# Patient Record
Sex: FEMALE | Race: WHITE | NOT HISPANIC OR LATINO | Employment: FULL TIME | URBAN - METROPOLITAN AREA
[De-identification: names, ages, dates, MRNs, and addresses within clinical notes are randomized per-mention and may not be internally consistent; named-entity substitution may affect disease eponyms.]

---

## 2018-10-02 ENCOUNTER — OFFICE VISIT (OUTPATIENT)
Dept: FAMILY MEDICINE CLINIC | Facility: CLINIC | Age: 38
End: 2018-10-02
Payer: COMMERCIAL

## 2018-10-02 VITALS
RESPIRATION RATE: 16 BRPM | BODY MASS INDEX: 21.04 KG/M2 | SYSTOLIC BLOOD PRESSURE: 110 MMHG | OXYGEN SATURATION: 99 % | TEMPERATURE: 97 F | HEART RATE: 62 BPM | WEIGHT: 122.6 LBS | DIASTOLIC BLOOD PRESSURE: 60 MMHG

## 2018-10-02 DIAGNOSIS — J32.9 CHRONIC SINUSITIS, UNSPECIFIED LOCATION: Primary | ICD-10-CM

## 2018-10-02 PROCEDURE — 99213 OFFICE O/P EST LOW 20 MIN: CPT | Performed by: PHYSICIAN ASSISTANT

## 2018-10-02 RX ORDER — CEFUROXIME AXETIL 500 MG/1
500 TABLET ORAL EVERY 12 HOURS SCHEDULED
Qty: 20 TABLET | Refills: 0 | Status: SHIPPED | OUTPATIENT
Start: 2018-10-02 | End: 2018-10-12

## 2018-10-02 RX ORDER — FLUTICASONE PROPIONATE 50 MCG
1-2 SPRAY, SUSPENSION (ML) NASAL DAILY
COMMUNITY
Start: 2015-08-12 | End: 2020-08-13 | Stop reason: ALTCHOICE

## 2018-10-02 RX ORDER — BENZONATATE 200 MG/1
200 CAPSULE ORAL 3 TIMES DAILY PRN
COMMUNITY
End: 2020-08-13 | Stop reason: ALTCHOICE

## 2018-10-02 RX ORDER — PREDNISONE 10 MG/1
TABLET ORAL
Qty: 30 TABLET | Refills: 0 | Status: SHIPPED | OUTPATIENT
Start: 2018-10-02 | End: 2018-10-12

## 2018-10-02 NOTE — PROGRESS NOTES
Assessment/Plan:         Diagnoses and all orders for this visit:    Chronic sinusitis, unspecified location  -     cefuroxime (CEFTIN) 500 mg tablet; Take 1 tablet (500 mg total) by mouth every 12 (twelve) hours for 10 days  -     predniSONE 10 mg tablet; 1-3-3-4-3-3-2-2-1-1 taper with food  -     Allergy Evaluation 1, [place]; Future  -     Allergy Evaluation 1, [address] [place] [address]  -     Ambulatory Referral to Otolaryngology; Future    Other orders  -     fluticasone (FLONASE) 50 mcg/act nasal spray; 1-2 sprays into each nostril daily  -     benzonatate (TESSALON) 200 MG capsule; Take 200 mg by mouth 3 (three) times a day as needed for cough      Discussed condition with pt  She has chronic persistent sinusitis  I explained that [place] are an antitussive, not an oral abx and she has no cough  I will treat her with 10 day course of Ceftin and also a 10 day oral Prednisone taper  Will also send her for allergy testing and refer to ENT for consult  She is to F/U if sx's do not improve  Chief Complaint   Patient presents with    Sinus Pressure     Patient presents for sinus pressure,congestion, pain in both ear  Patient also C/O headaches with nausea       Subjective:      Patient ID: Shannon Castro is a 34 y o  female  Pt presents with over month history of sinus pressure, HA, nasal congestion which she last had last week along with ST and PND  Denies cough, fever, chills, N/V/D  She used a [employer] service through her employer and was on at least one oral abx as well as what she believes was a steroid and then she thought she  was getting another abx but was given [place] and she does not have a cough  She has a history of chronic sinusitis  Believes she has environmental allergies but was never tested  Denies hx of asthma  Does not smoke  She has not yet received the flu shot           The following portions of the patient's history were reviewed and updated as appropriate: She  has no past medical history on file  She   Patient Active Problem List    Diagnosis Date Noted    Anxiety 12/09/2015    Chronic sinusitis 08/12/2015     She  has a past surgical history that includes No past surgeries  Her family history includes No Known Problems in her brother, father, sister, and sister  She  reports that she has never smoked  She has never used smokeless tobacco  She reports that she does not drink alcohol or use drugs  Current Outpatient Prescriptions   Medication Sig Dispense Refill    benzonatate (TESSALON) 200 MG capsule Take 200 mg by mouth 3 (three) times a day as needed for cough      fluticasone (FLONASE) 50 mcg/act nasal spray 1-2 sprays into each nostril daily      cefuroxime (CEFTIN) 500 mg tablet Take 1 tablet (500 mg total) by mouth every 12 (twelve) hours for 10 days 20 tablet 0    predniSONE 10 mg tablet 2-0-2-4-3-3-2-2-1-1 taper with food  30 tablet 0     No current facility-administered medications for this visit  No current outpatient prescriptions on file prior to visit  No current facility-administered medications on file prior to visit  She has No Known Allergies       Review of Systems   Constitutional: Negative  HENT: Positive for congestion, postnasal drip, sinus pressure and sore throat  Respiratory: Negative  Cardiovascular: Negative  Gastrointestinal: Negative  Genitourinary: Negative  Neurological: Positive for headaches  Objective:      /60 (BP Location: Right arm, Patient Position: Sitting, Cuff Size: Standard)   Pulse 62   Temp (!) 97 °F (36 1 °C) (Tympanic)   Resp 16   Wt 55 6 kg (122 lb 9 6 oz)   LMP 09/28/2018   SpO2 99%   BMI 21 04 kg/m²          Physical Exam   Constitutional: She is oriented to person, place, and time  She appears well-developed and well-nourished  No distress     HENT:   Right Ear: Hearing, tympanic membrane, external ear and ear canal normal    Left Ear: Hearing, tympanic membrane, external ear and ear canal normal    Nose: Mucosal edema (B/L boggy turbinates) present  Mouth/Throat: Mucous membranes are normal  Posterior oropharyngeal erythema (PND) present  No oropharyngeal exudate  Neck: Neck supple  Cardiovascular: Normal rate, regular rhythm and normal heart sounds  Pulmonary/Chest: Effort normal and breath sounds normal    Lymphadenopathy:     She has no cervical adenopathy  Neurological: She is alert and oriented to person, place, and time  Psychiatric: She has a normal mood and affect  Vitals reviewed

## 2018-11-24 ENCOUNTER — OFFICE VISIT (OUTPATIENT)
Dept: FAMILY MEDICINE CLINIC | Facility: CLINIC | Age: 38
End: 2018-11-24
Payer: COMMERCIAL

## 2018-11-24 VITALS
TEMPERATURE: 99.2 F | HEIGHT: 63 IN | HEART RATE: 67 BPM | OXYGEN SATURATION: 99 % | DIASTOLIC BLOOD PRESSURE: 70 MMHG | RESPIRATION RATE: 17 BRPM | BODY MASS INDEX: 21.74 KG/M2 | SYSTOLIC BLOOD PRESSURE: 104 MMHG | WEIGHT: 122.7 LBS

## 2018-11-24 DIAGNOSIS — J01.00 ACUTE NON-RECURRENT MAXILLARY SINUSITIS: Primary | ICD-10-CM

## 2018-11-24 DIAGNOSIS — J32.9 CHRONIC SINUSITIS, UNSPECIFIED LOCATION: ICD-10-CM

## 2018-11-24 PROCEDURE — 99213 OFFICE O/P EST LOW 20 MIN: CPT | Performed by: FAMILY MEDICINE

## 2018-11-24 RX ORDER — AMOXICILLIN AND CLAVULANATE POTASSIUM 875; 125 MG/1; MG/1
1 TABLET, FILM COATED ORAL EVERY 12 HOURS SCHEDULED
Qty: 20 TABLET | Refills: 0 | Status: SHIPPED | OUTPATIENT
Start: 2018-11-24 | End: 2018-12-04

## 2018-11-24 NOTE — PROGRESS NOTES
Assessment/Plan:    Chronic sinusitis   Patient with ongoing chronic sinus congestion and pressure  I reviewed her recent blood allergy panel, showing class 1 allergies to dust mites ( remainder of panel was negative)  In light of ongoing symptoms, I would recommend that she continue for fluticasone nasal spray  I also have referred her to ENT for further evaluation       Diagnoses and all orders for this visit:    Acute non-recurrent maxillary sinusitis  Comments:   will give Rx Augmentin b i d  times 10 days  Drink plenty of fluids  Call further problems  Orders:  -     amoxicillin-clavulanate (AUGMENTIN) 875-125 mg per tablet; Take 1 tablet by mouth every 12 (twelve) hours for 10 days    Chronic sinusitis, unspecified location  -     Ambulatory Referral to Otolaryngology; Future          Subjective:      Patient ID: Monroe Swanson is a 34 y o  female  1 week hx of sinus pressue, congestion  No fevers  Taking mucinex and flonase  Pt had a flu shot this year  patient also would like to discuss her ongoing chronic sinus pressure  She had recent allergy testing that she would like to review        The following portions of the patient's history were reviewed and updated as appropriate: allergies, current medications, past family history, past medical history, past social history, past surgical history and problem list     Review of Systems   Constitutional: Negative for chills and fever  HENT: Positive for congestion and postnasal drip  Respiratory: Positive for cough  Negative for shortness of breath  Cardiovascular: Negative  Gastrointestinal: Negative  Genitourinary: Negative            Objective:      /70 (BP Location: Left arm, Patient Position: Sitting, Cuff Size: Standard)   Pulse 67   Temp 99 2 °F (37 3 °C) (Tympanic)   Resp 17   Ht 5' 2 6" (1 59 m)   Wt 55 7 kg (122 lb 11 2 oz)   LMP 11/20/2018   SpO2 99%   BMI 22 01 kg/m²          Physical Exam   Constitutional: She appears well-developed and well-nourished  HENT:   Turbinates inflamed   Neck: Normal range of motion  Neck supple     Pulmonary/Chest: Effort normal and breath sounds normal

## 2018-11-24 NOTE — ASSESSMENT & PLAN NOTE
Patient with ongoing chronic sinus congestion and pressure  I reviewed her recent blood allergy panel, showing class 1 allergies to dust mites ( remainder of panel was negative)  In light of ongoing symptoms, I would recommend that she continue for fluticasone nasal spray    I also have referred her to ENT for further evaluation

## 2019-06-26 ENCOUNTER — OFFICE VISIT (OUTPATIENT)
Dept: FAMILY MEDICINE CLINIC | Facility: CLINIC | Age: 39
End: 2019-06-26
Payer: COMMERCIAL

## 2019-06-26 VITALS
TEMPERATURE: 99 F | HEIGHT: 63 IN | OXYGEN SATURATION: 98 % | RESPIRATION RATE: 16 BRPM | BODY MASS INDEX: 21.9 KG/M2 | DIASTOLIC BLOOD PRESSURE: 64 MMHG | HEART RATE: 73 BPM | WEIGHT: 123.6 LBS | SYSTOLIC BLOOD PRESSURE: 108 MMHG

## 2019-06-26 DIAGNOSIS — B96.89 ACUTE BACTERIAL SINUSITIS: Primary | ICD-10-CM

## 2019-06-26 DIAGNOSIS — J01.90 ACUTE BACTERIAL SINUSITIS: Primary | ICD-10-CM

## 2019-06-26 PROCEDURE — 99213 OFFICE O/P EST LOW 20 MIN: CPT | Performed by: FAMILY MEDICINE

## 2019-06-26 RX ORDER — MONTELUKAST SODIUM 10 MG/1
10 TABLET ORAL
COMMUNITY
Start: 2019-01-31 | End: 2019-06-26 | Stop reason: SDUPTHER

## 2019-06-26 RX ORDER — AMOXICILLIN AND CLAVULANATE POTASSIUM 875; 125 MG/1; MG/1
1 TABLET, FILM COATED ORAL EVERY 12 HOURS SCHEDULED
Qty: 20 TABLET | Refills: 0 | Status: SHIPPED | OUTPATIENT
Start: 2019-06-26 | End: 2019-07-06

## 2019-06-26 RX ORDER — MONTELUKAST SODIUM 10 MG/1
10 TABLET ORAL
Qty: 30 TABLET | Refills: 11 | Status: SHIPPED | OUTPATIENT
Start: 2019-06-26 | End: 2020-07-20

## 2020-07-18 DIAGNOSIS — B96.89 ACUTE BACTERIAL SINUSITIS: ICD-10-CM

## 2020-07-18 DIAGNOSIS — J01.90 ACUTE BACTERIAL SINUSITIS: ICD-10-CM

## 2020-07-20 RX ORDER — MONTELUKAST SODIUM 10 MG/1
10 TABLET ORAL
Qty: 90 TABLET | Refills: 0 | Status: SHIPPED | OUTPATIENT
Start: 2020-07-20 | End: 2020-10-14

## 2020-07-20 NOTE — TELEPHONE ENCOUNTER
Call patient  I refilled her Singulair but we have not seen her for a year  She should make an appointment

## 2020-08-13 ENCOUNTER — OFFICE VISIT (OUTPATIENT)
Dept: FAMILY MEDICINE CLINIC | Facility: CLINIC | Age: 40
End: 2020-08-13
Payer: COMMERCIAL

## 2020-08-13 ENCOUNTER — TELEPHONE (OUTPATIENT)
Dept: ADMINISTRATIVE | Facility: OTHER | Age: 40
End: 2020-08-13

## 2020-08-13 VITALS
TEMPERATURE: 98 F | DIASTOLIC BLOOD PRESSURE: 60 MMHG | WEIGHT: 128.8 LBS | SYSTOLIC BLOOD PRESSURE: 98 MMHG | HEIGHT: 63 IN | BODY MASS INDEX: 22.82 KG/M2 | OXYGEN SATURATION: 98 % | HEART RATE: 62 BPM

## 2020-08-13 DIAGNOSIS — Z13.6 ENCOUNTER FOR LIPID SCREENING FOR CARDIOVASCULAR DISEASE: ICD-10-CM

## 2020-08-13 DIAGNOSIS — R06.83 SNORING: ICD-10-CM

## 2020-08-13 DIAGNOSIS — Z00.00 ANNUAL PHYSICAL EXAM: Primary | ICD-10-CM

## 2020-08-13 DIAGNOSIS — J32.9 CHRONIC SINUSITIS, UNSPECIFIED LOCATION: ICD-10-CM

## 2020-08-13 DIAGNOSIS — Z13.220 ENCOUNTER FOR LIPID SCREENING FOR CARDIOVASCULAR DISEASE: ICD-10-CM

## 2020-08-13 DIAGNOSIS — Z13.1 SCREENING FOR DIABETES MELLITUS: ICD-10-CM

## 2020-08-13 PROCEDURE — 1036F TOBACCO NON-USER: CPT | Performed by: FAMILY MEDICINE

## 2020-08-13 PROCEDURE — 3008F BODY MASS INDEX DOCD: CPT | Performed by: FAMILY MEDICINE

## 2020-08-13 PROCEDURE — 3725F SCREEN DEPRESSION PERFORMED: CPT | Performed by: FAMILY MEDICINE

## 2020-08-13 PROCEDURE — 99396 PREV VISIT EST AGE 40-64: CPT | Performed by: FAMILY MEDICINE

## 2020-08-13 RX ORDER — TRIAMCINOLONE ACETONIDE 55 UG/1
SPRAY, METERED NASAL
Qty: 16.5 G | Refills: 1 | Status: SHIPPED | OUTPATIENT
Start: 2020-08-13 | End: 2021-05-12

## 2020-08-13 NOTE — PATIENT INSTRUCTIONS

## 2020-08-13 NOTE — TELEPHONE ENCOUNTER
Upon review of the In Basket request we were able to locate, review, and update the patient chart as requested for Pap Smear (HPV) aka Cervical Cancer Screening  Any additional questions or concerns should be emailed to the Practice Liaisons via [email or web address] email, please do not reply via In Basket      Thank you  Alice Calloway MA

## 2020-08-13 NOTE — PROGRESS NOTES
[facility]    NAME: Felice Bourgeois  AGE: 41 y o  SEX: female  : 1980     DATE: 2020     Assessment and Plan:     Patient 57-year-old female seen for well adult visit  Problem List Items Addressed This Visit        Respiratory    Chronic sinusitis    Relevant Medications    Triamcinolone Acetonide 55 MCG/ACT AERO    Other Relevant Orders    Ambulatory Referral to Otolaryngology    CBC and differential    TSH, 3rd generation with Free T4 reflex      Other Visit Diagnoses     Annual physical exam    -  Primary    Snoring        Relevant Orders    Ambulatory Referral to Otolaryngology    TSH, 3rd generation with Free T4 reflex    Encounter for lipid screening for cardiovascular disease        Relevant Orders    Lipid Panel with Direct LDL reflex    Screening for diabetes mellitus        Relevant Orders    Comprehensive metabolic panel        Patient was given orders for fasting blood work  She was also referred to ENT regarding snoring, chronic sinusitis and they could also check the posterior cervical node that she had noticed  Immunizations and preventive care screenings were discussed with patient today  Appropriate education was printed on patient's after visit summary  Counseling:  Alcohol/drug use: discussed moderation in alcohol intake, the recommendations for healthy alcohol use, and avoidance of illicit drug use  Dental Health: discussed importance of regular tooth brushing, flossing, and dental visits  · Exercise: the importance of regular exercise/physical activity was discussed  Recommend exercise 3-5 times per week for at least 30 minutes  No follow-ups on file       Chief Complaint:     Chief Complaint   Patient presents with    Physical Exam     Overdue for physical exam      History of Present Illness:     Adult Annual Physical   Patient here for a comprehensive physical exam  The patient reports problems -   concerns regarding allergies  Gets "sinus headaches" weekly - pressure in ears and eyes - takes Mucinex, along with Montelukast   Would like nasal spray again  Also snores  Diet and Physical Activity  · Diet/Nutrition: well balanced diet  Eats nutritiously  · Exercise: moderate cardiovascular exercise  Workout at home and walks     Depression Screening  PHQ-9 Depression Screening    PHQ-9:    Frequency of the following problems over the past two weeks:       Little interest or pleasure in doing things:  0 - not at all  Feeling down, depressed, or hopeless:  0 - not at all  PHQ-2 Score:  0       General Health  · Sleep:    Sleeps well  But snores so loud that  has to wear earplugs  · Hearing: normal - bilateral   · Vision: wears glasses  Glasses  · Dental: regular dental visits  /GYN Health  · Patient is: premenopausalMenses are heavy  · Last menstrual period: July 27  She does see a gynecologist      Review of Systems:     Review of Systems   Constitutional: Negative for chills and fever  HENT: Positive for congestion, sinus pressure and sinus pain  Negative for sore throat  Eyes: Positive for discharge  Respiratory: Negative for chest tightness  Cardiovascular: Negative for chest pain and palpitations  Gastrointestinal: Negative for abdominal pain, constipation, diarrhea and nausea  Genitourinary: Positive for menstrual problem (heavy, but regular)  Negative for difficulty urinating  Musculoskeletal: Negative  Skin: Negative  Neurological: Positive for headaches  Negative for dizziness  Hematological: Positive for adenopathy  Lump on let side of neck   Psychiatric/Behavioral: Negative  Past Medical History:     History reviewed  No pertinent past medical history  Past Surgical History:     Past Surgical History:   Procedure Laterality Date    NO PAST SURGERIES        Social History:   Has 8 15and 25year old       Social History     Socioeconomic History    Marital status: /Civil Union     Spouse name: None    Number of children: None    Years of education: None    Highest education level: None   Occupational History    None   Social Needs    Financial resource strain: None    Food insecurity     Worry: None     Inability: None    Transportation needs     Medical: None     Non-medical: None   Tobacco Use    Smoking status: Never Smoker    Smokeless tobacco: Never Used   Substance and Sexual Activity    Alcohol use: No    Drug use: No    Sexual activity: None   Lifestyle    Physical activity     Days per week: None     Minutes per session: None    Stress: None   Relationships    Social connections     Talks on phone: None     Gets together: None     Attends Gnosticism service: None     Active member of club or organization: None     Attends meetings of clubs or organizations: None     Relationship status: None    Intimate partner violence     Fear of current or ex partner: None     Emotionally abused: None     Physically abused: None     Forced sexual activity: None   Other Topics Concern    None   Social History Narrative    Occasional alcohol use - As per [employer]       Family History:     Family History   Problem Relation Age of Onset    No Known Problems Mother     No Known Problems Father     No Known Problems Sister     No Known Problems Brother     No Known Problems Sister    daughter with congenital heart disease   Current Medications:     Current Outpatient Medications   Medication Sig Dispense Refill    montelukast (SINGULAIR) 10 mg tablet TAKE 1 TABLET (10 MG TOTAL) BY MOUTH DAILY AT BEDTIME 90 tablet 0    Triamcinolone Acetonide 55 MCG/ACT AERO One spray each nostril twice a day  16 5 g 1     No current facility-administered medications for this visit         Allergies:     No Known Allergies   Physical Exam:     BP 98/60 (BP Location: Right arm, Patient Position: Sitting, Cuff Size: Adult) Pulse 62   Temp 98 °F (36 7 °C)   Ht 5' 3 39" (1 61 m)   Wt 58 4 kg (128 lb 12 8 oz)   LMP 07/27/2020 (Exact Date)   SpO2 98%   BMI 22 54 kg/m²     Physical Exam  Vitals signs and nursing note reviewed  Constitutional:       General: She is not in acute distress  Appearance: She is well-developed  HENT:      Head: Normocephalic  Right Ear: Tympanic membrane normal       Left Ear: Tympanic membrane normal       Nose: Congestion present  Eyes:      Pupils: Pupils are equal, round, and reactive to light  Neck:      Thyroid: No thyromegaly  Vascular: No carotid bruit  Cardiovascular:      Rate and Rhythm: Normal rate and regular rhythm  Heart sounds: Normal heart sounds  No murmur  Pulmonary:      Effort: Pulmonary effort is normal       Breath sounds: Normal breath sounds  Abdominal:      General: Bowel sounds are normal       Palpations: Abdomen is soft  Lymphadenopathy:      Cervical: Cervical adenopathy (freely movable nodule left posterior cervical node ) present  Skin:     General: Skin is warm and dry  Neurological:      Mental Status: She is alert and oriented to person, place, and time     Psychiatric:         Mood and Affect: Mood normal           Radha Reddy DO  [address]

## 2020-08-13 NOTE — TELEPHONE ENCOUNTER
----- Message from Selin Eastman, Chriss Priya Gonsalezd sent at 8/13/2020 12:02 PM EDT -----  Regarding: [facility]- Pap  08/13/20 12:02 PM    Hello, our patient Dora Bailey has had   Pap completed/performed  Please assist in updating the patient chart by pulling the Care Everywhere (CE) document  The date of service is 8/28/18       Thank you,  Selin Eastman MA  [facility]

## 2020-10-14 DIAGNOSIS — B96.89 ACUTE BACTERIAL SINUSITIS: ICD-10-CM

## 2020-10-14 DIAGNOSIS — J01.90 ACUTE BACTERIAL SINUSITIS: ICD-10-CM

## 2020-10-14 RX ORDER — MONTELUKAST SODIUM 10 MG/1
TABLET ORAL
Qty: 90 TABLET | Refills: 0 | Status: SHIPPED | OUTPATIENT
Start: 2020-10-14 | End: 2021-02-01

## 2021-01-31 DIAGNOSIS — B96.89 ACUTE BACTERIAL SINUSITIS: ICD-10-CM

## 2021-01-31 DIAGNOSIS — J01.90 ACUTE BACTERIAL SINUSITIS: ICD-10-CM

## 2021-02-01 RX ORDER — MONTELUKAST SODIUM 10 MG/1
TABLET ORAL
Qty: 90 TABLET | Refills: 0 | Status: SHIPPED | OUTPATIENT
Start: 2021-02-01

## 2021-04-14 DIAGNOSIS — Z23 ENCOUNTER FOR IMMUNIZATION: ICD-10-CM

## 2021-04-22 ENCOUNTER — IMMUNIZATIONS (OUTPATIENT)
Dept: FAMILY MEDICINE CLINIC | Facility: HOSPITAL | Age: 41
End: 2021-04-22

## 2021-04-22 DIAGNOSIS — Z23 ENCOUNTER FOR IMMUNIZATION: Primary | ICD-10-CM

## 2021-04-22 PROCEDURE — 0011A SARS-COV-2 / COVID-19 MRNA VACCINE (MODERNA) 100 MCG: CPT

## 2021-04-22 PROCEDURE — 91301 SARS-COV-2 / COVID-19 MRNA VACCINE (MODERNA) 100 MCG: CPT

## 2021-05-12 ENCOUNTER — OFFICE VISIT (OUTPATIENT)
Dept: FAMILY MEDICINE CLINIC | Facility: CLINIC | Age: 41
End: 2021-05-12
Payer: COMMERCIAL

## 2021-05-12 VITALS
OXYGEN SATURATION: 99 % | WEIGHT: 130.6 LBS | BODY MASS INDEX: 22.3 KG/M2 | HEART RATE: 55 BPM | DIASTOLIC BLOOD PRESSURE: 72 MMHG | HEIGHT: 64 IN | TEMPERATURE: 98.8 F | SYSTOLIC BLOOD PRESSURE: 104 MMHG

## 2021-05-12 DIAGNOSIS — R59.0 LYMPHADENOPATHY OF LEFT CERVICAL REGION: Primary | ICD-10-CM

## 2021-05-12 PROCEDURE — 99213 OFFICE O/P EST LOW 20 MIN: CPT | Performed by: FAMILY MEDICINE

## 2021-05-12 NOTE — PROGRESS NOTES
Assessment/Plan:     1  Lymphadenopathy of left cervical region  Assessment & Plan:  One of the 2 LN are palpating large on exam about 2 cm; check labs and u/s; may be reactive to vaccine; will need to follow; f/u guidance given    Orders:  -     US head neck soft tissue; Future; Expected date: 05/12/2021  -     CBC and differential; Future  -     TSH, 3rd generation; Future  -     C-reactive protein; Future  -     Sedimentation rate, automated; Future  -     Comprehensive metabolic panel; Future        Subjective:      Patient ID: Malathi Constantino is a 41 y o  female  Patient states she noticed 2 lumps in her neck  Noticed it today  No pain  No fevers/chills  No recent known illness or fatigue  No rashes  No sore throat  Mammogram in Sept 2020 was [facility]  Noticed a little weight gain but no unintended weight loss  No cough  Had 1st COVID vaccine a few weeks ago on same side as lumps  Noted after vaccine had some redness and swelling in arm that has since resolved  She noticed she had a few bumps in her neck a few months ago but were small  No changes  The following portions of the patient's history were reviewed and updated as appropriate: allergies, current medications, past family history, past medical history, past social history, past surgical history, and problem list     Review of Systems   Constitutional: Negative for chills, fatigue and fever  HENT: Negative  Respiratory: Negative  Cardiovascular: Negative  Gastrointestinal: Negative  Hematological: Positive for adenopathy  Objective:      /72 (BP Location: Right arm, Patient Position: Sitting, Cuff Size: Adult)   Pulse 55   Temp 98 8 °F (37 1 °C)   Ht 5' 3 78" (1 62 m)   Wt 59 2 kg (130 lb 9 6 oz)   LMP 05/10/2021 (Exact Date)   SpO2 99%   BMI 22 57 kg/m²          Physical Exam  Vitals signs reviewed  Constitutional:       General: She is not in acute distress  Appearance: Normal appearance   She is not ill-appearing, toxic-appearing or diaphoretic  HENT:      Head: Normocephalic and atraumatic  Right Ear: Tympanic membrane, ear canal and external ear normal  There is no impacted cerumen  Left Ear: Tympanic membrane, ear canal and external ear normal  There is no impacted cerumen  Nose: Nose normal  No congestion or rhinorrhea  Mouth/Throat:      Mouth: Mucous membranes are moist       Pharynx: Oropharynx is clear  Eyes:      General: No scleral icterus  Right eye: No discharge  Left eye: No discharge  Conjunctiva/sclera: Conjunctivae normal    Neck:      Musculoskeletal: Neck supple  No edema or erythema  Thyroid: No thyroid mass, thyromegaly or thyroid tenderness  Trachea: Trachea normal      Cardiovascular:      Rate and Rhythm: Normal rate and regular rhythm  Pulses: Normal pulses  Heart sounds: Normal heart sounds  No murmur  No gallop  Pulmonary:      Effort: Pulmonary effort is normal  No respiratory distress  Breath sounds: Normal breath sounds  No stridor  No wheezing, rhonchi or rales  Musculoskeletal:      Right lower leg: No edema  Left lower leg: No edema  Lymphadenopathy:      Cervical: Cervical adenopathy present  Neurological:      General: No focal deficit present  Mental Status: She is alert and oriented to person, place, and time  Psychiatric:         Mood and Affect: Mood normal          Behavior: Behavior normal          Thought Content:  Thought content normal          Judgment: Judgment normal

## 2021-05-12 NOTE — ASSESSMENT & PLAN NOTE
One of the 2 LN are palpating large on exam about 2 cm, check labs and u/s given size; may be reactive to vaccine; will need to follow; f/u guidance given

## 2021-05-13 ENCOUNTER — APPOINTMENT (OUTPATIENT)
Dept: LAB | Facility: CLINIC | Age: 41
End: 2021-05-13

## 2021-05-13 ENCOUNTER — TELEPHONE (OUTPATIENT)
Dept: ADMINISTRATIVE | Facility: OTHER | Age: 41
End: 2021-05-13

## 2021-05-13 DIAGNOSIS — R59.0 LYMPHADENOPATHY OF LEFT CERVICAL REGION: ICD-10-CM

## 2021-05-13 LAB
ALBUMIN SERPL BCP-MCNC: 4 G/DL (ref 3.5–5)
ALP SERPL-CCNC: 44 U/L (ref 46–116)
ALT SERPL W P-5'-P-CCNC: 20 U/L (ref 12–78)
ANION GAP SERPL CALCULATED.3IONS-SCNC: 5 MMOL/L (ref 4–13)
AST SERPL W P-5'-P-CCNC: 12 U/L (ref 5–45)
BASOPHILS # BLD AUTO: 0.07 THOUSANDS/ΜL (ref 0–0.1)
BASOPHILS NFR BLD AUTO: 1 % (ref 0–1)
BILIRUB SERPL-MCNC: 0.33 MG/DL (ref 0.2–1)
BUN SERPL-MCNC: 13 MG/DL (ref 5–25)
CALCIUM SERPL-MCNC: 9.3 MG/DL (ref 8.3–10.1)
CHLORIDE SERPL-SCNC: 104 MMOL/L (ref 100–108)
CO2 SERPL-SCNC: 28 MMOL/L (ref 21–32)
CREAT SERPL-MCNC: 0.76 MG/DL (ref 0.6–1.3)
CRP SERPL QL: <3 MG/L
EOSINOPHIL # BLD AUTO: 0.23 THOUSAND/ΜL (ref 0–0.61)
EOSINOPHIL NFR BLD AUTO: 3 % (ref 0–6)
ERYTHROCYTE [DISTWIDTH] IN BLOOD BY AUTOMATED COUNT: 14 % (ref 11.6–15.1)
ERYTHROCYTE [SEDIMENTATION RATE] IN BLOOD: 8 MM/HOUR (ref 0–19)
GFR SERPL CREATININE-BSD FRML MDRD: 97 ML/MIN/1.73SQ M
GLUCOSE SERPL-MCNC: 88 MG/DL (ref 65–140)
HCT VFR BLD AUTO: 38 % (ref 34.8–46.1)
HGB BLD-MCNC: 11.8 G/DL (ref 11.5–15.4)
IMM GRANULOCYTES # BLD AUTO: 0.02 THOUSAND/UL (ref 0–0.2)
IMM GRANULOCYTES NFR BLD AUTO: 0 % (ref 0–2)
LYMPHOCYTES # BLD AUTO: 2.41 THOUSANDS/ΜL (ref 0.6–4.47)
LYMPHOCYTES NFR BLD AUTO: 31 % (ref 14–44)
MCH RBC QN AUTO: 27.3 PG (ref 26.8–34.3)
MCHC RBC AUTO-ENTMCNC: 31.1 G/DL (ref 31.4–37.4)
MCV RBC AUTO: 88 FL (ref 82–98)
MONOCYTES # BLD AUTO: 0.7 THOUSAND/ΜL (ref 0.17–1.22)
MONOCYTES NFR BLD AUTO: 9 % (ref 4–12)
NEUTROPHILS # BLD AUTO: 4.47 THOUSANDS/ΜL (ref 1.85–7.62)
NEUTS SEG NFR BLD AUTO: 56 % (ref 43–75)
NRBC BLD AUTO-RTO: 0 /100 WBCS
PLATELET # BLD AUTO: 262 THOUSANDS/UL (ref 149–390)
PMV BLD AUTO: 12.1 FL (ref 8.9–12.7)
POTASSIUM SERPL-SCNC: 3.8 MMOL/L (ref 3.5–5.3)
PROT SERPL-MCNC: 7.5 G/DL (ref 6.4–8.2)
RBC # BLD AUTO: 4.32 MILLION/UL (ref 3.81–5.12)
SODIUM SERPL-SCNC: 137 MMOL/L (ref 136–145)
TSH SERPL DL<=0.05 MIU/L-ACNC: 2.13 UIU/ML (ref 0.36–3.74)
WBC # BLD AUTO: 7.9 THOUSAND/UL (ref 4.31–10.16)

## 2021-05-13 PROCEDURE — 80053 COMPREHEN METABOLIC PANEL: CPT

## 2021-05-13 PROCEDURE — 85652 RBC SED RATE AUTOMATED: CPT

## 2021-05-13 PROCEDURE — 85025 COMPLETE CBC W/AUTO DIFF WBC: CPT

## 2021-05-13 PROCEDURE — 86140 C-REACTIVE PROTEIN: CPT

## 2021-05-13 PROCEDURE — 84443 ASSAY THYROID STIM HORMONE: CPT

## 2021-05-13 PROCEDURE — 36415 COLL VENOUS BLD VENIPUNCTURE: CPT

## 2021-05-13 NOTE — TELEPHONE ENCOUNTER
Upon review of the In Basket request we were able to locate, review, and update the patient chart as requested for Mammogram     Any additional questions or concerns should be emailed to the Practice Liaisons via [email or web address] email, please do not reply via In Basket      Thank you  Nemo Joy

## 2021-05-13 NOTE — TELEPHONE ENCOUNTER
----- Message from Shalini Rose MA sent at 5/12/2021  3:51 PM EDT -----  Regarding: Care Gap Request  05/12/21 3:51 PM    Hello, our patient Garrett Lyles has had Mammogram completed/performed  Please assist in updating the patient chart by pulling the Care Everywhere (CE) document  The date of service is 9/29/20       Thank you,  Shalini Rose MA  [facility]

## 2021-05-20 ENCOUNTER — HOSPITAL ENCOUNTER (OUTPATIENT)
Dept: ULTRASOUND IMAGING | Facility: MEDICAL CENTER | Age: 41
Discharge: HOME | End: 2021-05-20
Payer: COMMERCIAL

## 2021-05-20 DIAGNOSIS — R59.0 LYMPHADENOPATHY OF LEFT CERVICAL REGION: ICD-10-CM

## 2021-05-20 PROCEDURE — 76536 US EXAM OF HEAD AND NECK: CPT

## 2021-05-26 ENCOUNTER — IMMUNIZATIONS (OUTPATIENT)
Dept: FAMILY MEDICINE CLINIC | Facility: HOSPITAL | Age: 41
End: 2021-05-26

## 2021-05-26 DIAGNOSIS — Z23 ENCOUNTER FOR IMMUNIZATION: Primary | ICD-10-CM

## 2021-05-26 PROCEDURE — 91301 SARS-COV-2 / COVID-19 MRNA VACCINE (MODERNA) 100 MCG: CPT

## 2021-05-26 PROCEDURE — 0012A SARS-COV-2 / COVID-19 MRNA VACCINE (MODERNA) 100 MCG: CPT

## 2021-06-15 ENCOUNTER — OFFICE VISIT (OUTPATIENT)
Dept: FAMILY MEDICINE CLINIC | Facility: CLINIC | Age: 41
End: 2021-06-15
Payer: COMMERCIAL

## 2021-06-15 VITALS
WEIGHT: 130.4 LBS | OXYGEN SATURATION: 97 % | HEIGHT: 64 IN | SYSTOLIC BLOOD PRESSURE: 102 MMHG | HEART RATE: 63 BPM | BODY MASS INDEX: 22.26 KG/M2 | DIASTOLIC BLOOD PRESSURE: 64 MMHG | TEMPERATURE: 97.1 F | RESPIRATION RATE: 16 BRPM

## 2021-06-15 DIAGNOSIS — R59.0 LYMPHADENOPATHY OF LEFT CERVICAL REGION: Primary | ICD-10-CM

## 2021-06-15 PROCEDURE — 99213 OFFICE O/P EST LOW 20 MIN: CPT | Performed by: FAMILY MEDICINE

## 2021-06-15 NOTE — PROGRESS NOTES
Assessment/Plan:     1  Lymphadenopathy of left cervical region  Assessment & Plan:  Decreased in size; no concerning findings on u/s or labs; advised likely secondary to COVID19 vaccine; will continue to monitor; f/u guidance given should changes occur          Subjective:      Patient ID: Anisa Zhu is a 41 y o  female  Patient is feeling well  Feels LNs are smaller  Had ultrasound that did not show any concerning morphology  CBC was WNL  Pt had second COVID19 vaccine end of May on same side  UTD on preventative screening  The following portions of the patient's history were reviewed and updated as appropriate: allergies, current medications, past family history, past medical history, past social history, past surgical history, and problem list     Review of Systems   Constitutional: Negative for chills, fatigue and fever  Respiratory: Negative for shortness of breath  Cardiovascular: Negative for chest pain and palpitations  Hematological: Negative for adenopathy  Does not bruise/bleed easily  Objective:      /64 (BP Location: Left arm, Patient Position: Sitting, Cuff Size: Standard)   Pulse 63   Temp (!) 97 1 °F (36 2 °C) (Tympanic)   Resp 16   Ht 5' 4" (1 626 m)   Wt 59 1 kg (130 lb 6 4 oz)   LMP 06/04/2021   SpO2 97%   BMI 22 38 kg/m²          Physical Exam  Vitals reviewed  Constitutional:       General: She is not in acute distress  Appearance: Normal appearance  She is not ill-appearing, toxic-appearing or diaphoretic  HENT:      Head: Normocephalic and atraumatic  Eyes:      General: No scleral icterus  Right eye: No discharge  Left eye: No discharge  Conjunctiva/sclera: Conjunctivae normal    Neck:     Cardiovascular:      Rate and Rhythm: Normal rate and regular rhythm  Pulses: Normal pulses  Heart sounds: Normal heart sounds  No murmur heard  No gallop      Pulmonary:      Effort: Pulmonary effort is normal  No respiratory distress  Breath sounds: Normal breath sounds  No stridor  No wheezing, rhonchi or rales  Musculoskeletal:      Right lower leg: No edema  Left lower leg: No edema  Neurological:      General: No focal deficit present  Mental Status: She is alert and oriented to person, place, and time  Psychiatric:         Mood and Affect: Mood normal          Behavior: Behavior normal          Thought Content:  Thought content normal          Judgment: Judgment normal

## 2021-06-15 NOTE — ASSESSMENT & PLAN NOTE
Decreased in size; no concerning findings on u/s or labs; advised likely secondary to COVID19 vaccine; will continue to monitor; f/u guidance given should changes occur

## 2022-01-12 ENCOUNTER — TELEMEDICINE (OUTPATIENT)
Dept: FAMILY MEDICINE CLINIC | Facility: CLINIC | Age: 42
End: 2022-01-12
Payer: COMMERCIAL

## 2022-01-12 DIAGNOSIS — J01.40 ACUTE NON-RECURRENT PANSINUSITIS: Primary | ICD-10-CM

## 2022-01-12 PROCEDURE — 99213 OFFICE O/P EST LOW 20 MIN: CPT | Performed by: FAMILY MEDICINE

## 2022-01-12 RX ORDER — BENZONATATE 100 MG/1
100 CAPSULE ORAL 3 TIMES DAILY PRN
Qty: 20 CAPSULE | Refills: 0 | Status: SHIPPED | OUTPATIENT
Start: 2022-01-12

## 2022-01-12 RX ORDER — AMOXICILLIN AND CLAVULANATE POTASSIUM 875; 125 MG/1; MG/1
1 TABLET, FILM COATED ORAL EVERY 12 HOURS SCHEDULED
Qty: 14 TABLET | Refills: 0 | Status: SHIPPED | OUTPATIENT
Start: 2022-01-12 | End: 2022-01-19

## 2022-01-12 NOTE — PROGRESS NOTES
Virtual Regular Visit    Verification of patient location:    Patient is located in the following state in which I hold an active license [place]      Assessment/Plan:    Problem List Items Addressed This Visit        Respiratory    Acute non-recurrent pansinusitis - Primary     C/w sinusitis given sx >10 days; abx as ordered; advised antihistamines and nasal saline; c/w OTC symptom relief; f/u guidance given should sx worsen or not resolve with medication         Relevant Medications    amoxicillin-clavulanate (AUGMENTIN) 875-125 mg per tablet    benzonatate (TESSALON PERLES) 100 mg capsule               Reason for visit is   Chief Complaint   Patient presents with    Cold Like Symptoms     patient c/o Sore Throat , Dry Cough , Nasal Drip appox 2weeks     COVID-19     at home test was negative 2 days ago    Virtual Regular Visit        Encounter provider Roger Gooden DO    Provider located at [address]   [address] [facility] [address]  [phone]      Recent Visits  No visits were found meeting these conditions  Showing recent visits within past 7 days and meeting all other requirements  Today's Visits  Date Type Provider Dept   01/12/22 Telemedicine Roger Gooden DO Pg [address] Beka Jaffe today's visits and meeting all other requirements  Future Appointments  No visits were found meeting these conditions  Showing future appointments within next 150 days and meeting all other requirements       The patient was identified by name and date of birth  Sophia Crowell was informed that this is a telemedicine visit and that the visit is being conducted through [address] [place] [address] Now and patient was informed that this is a secure, HIPAA-compliant platform  She agrees to proceed     My office door was closed  No one else was in the room  She acknowledged consent and understanding of privacy and security of the video platform   The patient has agreed to participate and understands they can discontinue the visit at any time  Patient is aware this is a billable service  Travis Saez is a 35 y o  female persistent congestion  Upper Respiratory Infection  Patient complains of symptoms of a URI, possible sinusitis  Symptoms include achiness, nasal congestion, non productive cough, post nasal drip, sinus pressure and sore throat  Onset of symptoms was 2 weeks ago, and has been gradually worsening since that time  Treatment to date: cough suppressants and decongestants  Took home COVID test and was negative  Pt had chills and body aches initially which have resolved but has continued sinus pressure and congestion  Using nasal spray and OTC medication without improvement  No past medical history on file  Past Surgical History:   Procedure Laterality Date    NO PAST SURGERIES         Current Outpatient Medications   Medication Sig Dispense Refill    montelukast (SINGULAIR) 10 mg tablet TAKE 1 TABLET BY MOUTH DAILY AT BEDTIME 90 tablet 0    propranolol (INDERAL LA) 80 mg 24 hr capsule Take 1 capsule (80 mg total) by mouth daily 90 capsule 3    amoxicillin-clavulanate (AUGMENTIN) 875-125 mg per tablet Take 1 tablet by mouth every 12 (twelve) hours for 7 days 14 tablet 0    benzonatate (TESSALON PERLES) 100 mg capsule Take 1 capsule (100 mg total) by mouth 3 (three) times a day as needed for cough 20 capsule 0     No current facility-administered medications for this visit  No Known Allergies    Review of Systems   Constitutional: Negative for chills and fever  HENT: Positive for congestion, postnasal drip, rhinorrhea, sinus pressure and sore throat  Respiratory: Positive for cough  Negative for shortness of breath and wheezing  Video Exam    There were no vitals filed for this visit  Physical Exam  Constitutional:       General: She is not in acute distress  Appearance: Normal appearance  She is not ill-appearing or toxic-appearing  HENT:      Head: Normocephalic and atraumatic  Pulmonary:      Effort: Pulmonary effort is normal    Neurological:      General: No focal deficit present  Mental Status: She is alert and oriented to person, place, and time  Psychiatric:         Mood and Affect: Mood normal          Behavior: Behavior normal          Thought Content: Thought content normal          Judgment: Judgment normal           I spent 8 minutes directly with the patient during this visit    VIRTUAL VISIT [id number] [address] Liang Boss verbally agrees to participate in [employer]  Pt is aware that [employer] could be limited without vital signs or the ability to perform a full hands-on physical Gabriella Marker understands she or the provider may request at any time to terminate the video visit and request the patient to seek care or treatment in person

## 2022-01-12 NOTE — ASSESSMENT & PLAN NOTE
C/w sinusitis given sx >10 days; abx as ordered; advised antihistamines and nasal saline; c/w OTC symptom relief; f/u guidance given should sx worsen or not resolve with medication

## 2022-06-02 DIAGNOSIS — G43.009 MIGRAINE WITHOUT AURA AND WITHOUT STATUS MIGRAINOSUS, NOT INTRACTABLE: ICD-10-CM

## 2022-06-02 NOTE — TELEPHONE ENCOUNTER
Pt called, LVM requesting refill of propranolol sent to [facility] in [facility]  Pt acknowledged that Dr Magda Yang office initially prescribed the medication, but she hasn't been in the office to see them in over a year  Dr Yarely Atkinson advised pt to check with PCP to see if it could be refilled  Pt requested callback if our office cannot do that, number is [phone]   Thank you

## 2022-06-04 RX ORDER — PROPRANOLOL HYDROCHLORIDE 80 MG/1
80 CAPSULE, EXTENDED RELEASE ORAL DAILY
Qty: 90 CAPSULE | Refills: 0 | Status: SHIPPED | OUTPATIENT
Start: 2022-06-04

## 2022-06-04 NOTE — TELEPHONE ENCOUNTER
Patient should schedule annual and to review medications since I did not fill in past and has not been seen since June of last year for other reason  I did give her a 90 day supply to give time to schedule

## 2022-09-05 DIAGNOSIS — G43.009 MIGRAINE WITHOUT AURA AND WITHOUT STATUS MIGRAINOSUS, NOT INTRACTABLE: ICD-10-CM

## 2022-09-06 RX ORDER — PROPRANOLOL HYDROCHLORIDE 80 MG/1
CAPSULE, EXTENDED RELEASE ORAL
Qty: 90 CAPSULE | Refills: 0 | OUTPATIENT
Start: 2022-09-06

## 2022-09-09 RX ORDER — PROPRANOLOL HYDROCHLORIDE 80 MG/1
80 CAPSULE, EXTENDED RELEASE ORAL DAILY
Qty: 30 CAPSULE | Refills: 0 | Status: SHIPPED | OUTPATIENT
Start: 2022-09-09 | End: 2022-09-26 | Stop reason: SDUPTHER

## 2022-09-26 ENCOUNTER — OFFICE VISIT (OUTPATIENT)
Dept: FAMILY MEDICINE CLINIC | Facility: CLINIC | Age: 42
End: 2022-09-26
Payer: COMMERCIAL

## 2022-09-26 VITALS
HEART RATE: 62 BPM | TEMPERATURE: 99 F | DIASTOLIC BLOOD PRESSURE: 62 MMHG | WEIGHT: 126 LBS | BODY MASS INDEX: 21.51 KG/M2 | OXYGEN SATURATION: 100 % | HEIGHT: 64 IN | SYSTOLIC BLOOD PRESSURE: 100 MMHG

## 2022-09-26 DIAGNOSIS — Z23 ENCOUNTER FOR IMMUNIZATION: ICD-10-CM

## 2022-09-26 DIAGNOSIS — Z13.0 SCREENING, ANEMIA, DEFICIENCY, IRON: ICD-10-CM

## 2022-09-26 DIAGNOSIS — Z13.29 SCREENING FOR THYROID DISORDER: ICD-10-CM

## 2022-09-26 DIAGNOSIS — Z23 NEED FOR VACCINATION: ICD-10-CM

## 2022-09-26 DIAGNOSIS — Z11.59 NEED FOR HEPATITIS C SCREENING TEST: ICD-10-CM

## 2022-09-26 DIAGNOSIS — Z00.00 ROUTINE ADULT HEALTH MAINTENANCE: Primary | ICD-10-CM

## 2022-09-26 DIAGNOSIS — Z12.31 ENCOUNTER FOR SCREENING MAMMOGRAM FOR BREAST CANCER: ICD-10-CM

## 2022-09-26 DIAGNOSIS — G43.009 MIGRAINE WITHOUT AURA AND WITHOUT STATUS MIGRAINOSUS, NOT INTRACTABLE: ICD-10-CM

## 2022-09-26 DIAGNOSIS — Z13.6 SCREENING FOR CARDIOVASCULAR CONDITION: ICD-10-CM

## 2022-09-26 DIAGNOSIS — Z11.4 SCREENING FOR HIV (HUMAN IMMUNODEFICIENCY VIRUS): ICD-10-CM

## 2022-09-26 PROCEDURE — 90471 IMMUNIZATION ADMIN: CPT | Performed by: FAMILY MEDICINE

## 2022-09-26 PROCEDURE — 90472 IMMUNIZATION ADMIN EACH ADD: CPT | Performed by: FAMILY MEDICINE

## 2022-09-26 PROCEDURE — 90686 IIV4 VACC NO PRSV 0.5 ML IM: CPT | Performed by: FAMILY MEDICINE

## 2022-09-26 PROCEDURE — 99396 PREV VISIT EST AGE 40-64: CPT | Performed by: FAMILY MEDICINE

## 2022-09-26 PROCEDURE — 3725F SCREEN DEPRESSION PERFORMED: CPT | Performed by: FAMILY MEDICINE

## 2022-09-26 PROCEDURE — 90715 TDAP VACCINE 7 YRS/> IM: CPT | Performed by: FAMILY MEDICINE

## 2022-09-26 RX ORDER — PROPRANOLOL HYDROCHLORIDE 80 MG/1
80 CAPSULE, EXTENDED RELEASE ORAL DAILY
Qty: 30 CAPSULE | Refills: 0 | Status: SHIPPED | OUTPATIENT
Start: 2022-09-26 | End: 2022-09-26 | Stop reason: SDUPTHER

## 2022-09-26 RX ORDER — PROPRANOLOL HYDROCHLORIDE 80 MG/1
80 CAPSULE, EXTENDED RELEASE ORAL DAILY
Qty: 90 CAPSULE | Refills: 0 | Status: SHIPPED | OUTPATIENT
Start: 2022-09-26

## 2022-09-26 NOTE — PROGRESS NOTES
[facility]    NAME: Joseph Barrera  AGE: 38 y o  SEX: female  : 1980     DATE: 2022     Assessment and Plan:     Problem List Items Addressed This Visit        Cardiovascular and Mediastinum    Migraine without aura and without status migrainosus, not intractable     C/w propanolol; referred to neurology as she would like to discuss options such as botox         Relevant Medications    propranolol (INDERAL LA) 80 mg 24 hr capsule    Other Relevant Orders    Ambulatory Referral to Neurology    TSH, 3rd generation       Other    Routine adult health maintenance - Primary     C/w healthy diet and exercise; flu and tdap given today; advised on COVID19 vaccines; advised due for mammogram; UTD on PAP; check screening labs; has dental home; f/u in 1 year or PRN           Other Visit Diagnoses     Need for hepatitis C screening test        Relevant Orders    Hepatitis C Antibody ([facility])    Screening for HIV (human immunodeficiency virus)        Relevant Orders    HIV 1/2 Antigen/Antibody (4th Generation) w Reflex [facility]    Encounter for screening mammogram for breast cancer        Relevant Orders    Mammo screening bilateral w 3d & cad    Encounter for immunization        Relevant Orders    TDAP VACCINE GREATER THAN OR EQUAL TO 8YO IM (Completed)    Need for vaccination        Relevant Orders    influenza vaccine, quadrivalent, 0 5 mL, preservative-free, for adult and pediatric patients 6 mos+ (AFLURIA, FLUARIX, FLULAVAL, FLUZONE) (Completed)    Screening for cardiovascular condition        Relevant Orders    Comprehensive metabolic panel    Lipid panel    Screening, anemia, deficiency, iron        Relevant Orders    CBC and differential    Screening for thyroid disorder        Relevant Orders    TSH, 3rd generation          Immunizations and preventive care screenings were discussed with patient today   Appropriate education was printed on patient's after visit summary  Counseling:  Dental Health: discussed importance of regular tooth brushing, flossing, and dental visits  · Exercise: the importance of regular exercise/physical activity was discussed  Recommend exercise 3-5 times per week for at least 30 minutes  Depression Screening and Follow-up Plan: Patient was screened for depression during today's encounter  They screened negative with a PHQ-2 score of 0  Return in about 1 year (around 9/26/2023) for Annual physical      Chief Complaint:     Chief Complaint   Patient presents with    Follow-up     Med check    Physical Exam     Med check / bw      History of Present Illness:     Adult Annual Physical   Patient here for a comprehensive physical exam  The patient reports no problems  Patient states she is on propanolol for migraines and did help with migraines  She does not love the idea of a daily medication  She states prior to medication would have daily headaches that would then get a migraine every 1-2x/week  Patient wondering if she needs it or are there other options  Diet and Physical Activity  · Diet/Nutrition: well balanced diet  Makes healthy choices  Does not eat a lot of meat  · Exercise: 3-4 times a week on average  Depression Screening  PHQ-2/9 Depression Screening    Little interest or pleasure in doing things: 0 - not at all  Feeling down, depressed, or hopeless: 0 - not at all  PHQ-2 Score: 0  PHQ-2 Interpretation: Negative depression screen       General Health  · Hearing: normal - bilateral   · Vision: goes for regular eye exams and wears glasses  · Dental: regular dental visits  /GYN Health  · Patient is: premenopausal  · Last menstrual period: regular  · Does get severe symptoms prior to period  · Last pap 2018 and was WNL     Review of Systems:     Review of Systems   Constitutional: Negative for chills and fever     Respiratory: Negative for shortness of breath  Cardiovascular: Negative for chest pain  Gastrointestinal: Negative for abdominal pain  Past Medical History:     History reviewed  No pertinent past medical history  Past Surgical History:     Past Surgical History:   Procedure Laterality Date    NO PAST SURGERIES        Social History:     Social History     Socioeconomic History    Marital status: /Civil Union     Spouse name: None    Number of children: None    Years of education: None    Highest education level: None   Occupational History    None   Tobacco Use    Smoking status: Never Smoker    Smokeless tobacco: Never Used   Vaping Use    Vaping Use: Never used   Substance and Sexual Activity    Alcohol use: No    Drug use: No    Sexual activity: None   Other Topics Concern    None   Social History Narrative    Occasional alcohol use - As per [employer]     Consumes 1 cup of coffee per day     Social Determinants of Health     Financial Resource Strain: Not on file   Food Insecurity: Not on file   Transportation Needs: Not on file   Physical Activity: Not on file   Stress: Not on file   Social Connections: Not on file   Intimate Partner Violence: Not on file   Housing Stability: Not on file      Family History:     Family History   Problem Relation Age of Onset    No Known Problems Mother     No Known Problems Father     No Known Problems Sister     No Known Problems Brother     No Known Problems Sister       Current Medications:     Current Outpatient Medications   Medication Sig Dispense Refill    propranolol (INDERAL LA) 80 mg 24 hr capsule Take 1 capsule (80 mg total) by mouth daily 90 capsule 0     No current facility-administered medications for this visit        Allergies:     No Known Allergies   Physical Exam:     /62 (BP Location: Right arm, Patient Position: Sitting, Cuff Size: Standard)   Pulse 62   Temp 99 °F (37 2 °C) (Temporal)   Ht 5' 4" (1 626 m)   Wt 57 2 kg (126 lb)   SpO2 100%   BMI 21 63 kg/m²     Physical Exam  Vitals reviewed  Constitutional:       General: She is not in acute distress  Appearance: Normal appearance  She is normal weight  She is not ill-appearing or toxic-appearing  HENT:      Head: Normocephalic and atraumatic  Right Ear: Tympanic membrane, ear canal and external ear normal  There is no impacted cerumen  Left Ear: Tympanic membrane, ear canal and external ear normal  There is no impacted cerumen  Nose: Nose normal       Mouth/Throat:      Mouth: Mucous membranes are moist       Pharynx: No oropharyngeal exudate or posterior oropharyngeal erythema  Eyes:      General: No scleral icterus  Left eye: No discharge  Conjunctiva/sclera: Conjunctivae normal       Pupils: Pupils are equal, round, and reactive to light  Neck:      Thyroid: No thyroid mass, thyromegaly or thyroid tenderness  Cardiovascular:      Rate and Rhythm: Normal rate and regular rhythm  Heart sounds: Normal heart sounds  No murmur heard  Pulmonary:      Effort: Pulmonary effort is normal  No respiratory distress  Breath sounds: Normal breath sounds  No wheezing, rhonchi or rales  Abdominal:      General: Abdomen is flat  Palpations: There is no mass  Tenderness: There is no abdominal tenderness  There is no guarding  Hernia: No hernia is present  Musculoskeletal:         General: No swelling  Cervical back: Neck supple  No muscular tenderness  Lymphadenopathy:      Cervical: No cervical adenopathy  Skin:     Findings: No rash  Neurological:      Mental Status: She is alert and oriented to person, place, and time  Psychiatric:         Mood and Affect: Mood normal          Behavior: Behavior normal          Thought Content:  Thought content normal          Judgment: Judgment normal           Jose Allison DO  [address] [facility] [address]

## 2022-09-26 NOTE — ASSESSMENT & PLAN NOTE
C/w healthy diet and exercise; flu and tdap given today; advised on COVID19 vaccines; advised due for mammogram; UTD on PAP; check screening labs; has dental home; f/u in 1 year or PRN

## 2022-10-12 PROBLEM — J01.40 ACUTE NON-RECURRENT PANSINUSITIS: Status: RESOLVED | Noted: 2022-01-12 | Resolved: 2022-10-12

## 2022-10-25 ENCOUNTER — APPOINTMENT (OUTPATIENT)
Dept: LAB | Facility: CLINIC | Age: 42
End: 2022-10-25
Payer: COMMERCIAL

## 2022-10-25 DIAGNOSIS — Z13.6 SCREENING FOR CARDIOVASCULAR CONDITION: ICD-10-CM

## 2022-10-25 DIAGNOSIS — G43.009 MIGRAINE WITHOUT AURA AND WITHOUT STATUS MIGRAINOSUS, NOT INTRACTABLE: ICD-10-CM

## 2022-10-25 DIAGNOSIS — Z13.0 SCREENING, ANEMIA, DEFICIENCY, IRON: ICD-10-CM

## 2022-10-25 DIAGNOSIS — Z11.4 SCREENING FOR HIV (HUMAN IMMUNODEFICIENCY VIRUS): ICD-10-CM

## 2022-10-25 DIAGNOSIS — Z13.29 SCREENING FOR THYROID DISORDER: ICD-10-CM

## 2022-10-25 DIAGNOSIS — Z11.59 NEED FOR HEPATITIS C SCREENING TEST: ICD-10-CM

## 2022-10-25 LAB
ALBUMIN SERPL BCP-MCNC: 3.9 G/DL (ref 3.5–5)
ALP SERPL-CCNC: 51 U/L (ref 46–116)
ALT SERPL W P-5'-P-CCNC: 22 U/L (ref 12–78)
ANION GAP SERPL CALCULATED.3IONS-SCNC: 6 MMOL/L (ref 4–13)
AST SERPL W P-5'-P-CCNC: 14 U/L (ref 5–45)
BASOPHILS # BLD AUTO: 0.05 THOUSANDS/ÂΜL (ref 0–0.1)
BASOPHILS NFR BLD AUTO: 1 % (ref 0–1)
BILIRUB SERPL-MCNC: 0.36 MG/DL (ref 0.2–1)
BUN SERPL-MCNC: 11 MG/DL (ref 5–25)
CALCIUM SERPL-MCNC: 9.5 MG/DL (ref 8.3–10.1)
CHLORIDE SERPL-SCNC: 108 MMOL/L (ref 96–108)
CHOLEST SERPL-MCNC: 196 MG/DL
CO2 SERPL-SCNC: 24 MMOL/L (ref 21–32)
CREAT SERPL-MCNC: 0.88 MG/DL (ref 0.6–1.3)
EOSINOPHIL # BLD AUTO: 0.37 THOUSAND/ÂΜL (ref 0–0.61)
EOSINOPHIL NFR BLD AUTO: 7 % (ref 0–6)
ERYTHROCYTE [DISTWIDTH] IN BLOOD BY AUTOMATED COUNT: 14.4 % (ref 11.6–15.1)
GFR SERPL CREATININE-BSD FRML MDRD: 80 ML/MIN/1.73SQ M
GLUCOSE P FAST SERPL-MCNC: 91 MG/DL (ref 65–99)
HCT VFR BLD AUTO: 36.3 % (ref 34.8–46.1)
HCV AB SER QL: NORMAL
HDLC SERPL-MCNC: 77 MG/DL
HGB BLD-MCNC: 10.8 G/DL (ref 11.5–15.4)
IMM GRANULOCYTES # BLD AUTO: 0.01 THOUSAND/UL (ref 0–0.2)
IMM GRANULOCYTES NFR BLD AUTO: 0 % (ref 0–2)
LDLC SERPL CALC-MCNC: 107 MG/DL (ref 0–100)
LYMPHOCYTES # BLD AUTO: 2.27 THOUSANDS/ÂΜL (ref 0.6–4.47)
LYMPHOCYTES NFR BLD AUTO: 40 % (ref 14–44)
MCH RBC QN AUTO: 24.2 PG (ref 26.8–34.3)
MCHC RBC AUTO-ENTMCNC: 29.8 G/DL (ref 31.4–37.4)
MCV RBC AUTO: 81 FL (ref 82–98)
MONOCYTES # BLD AUTO: 0.48 THOUSAND/ÂΜL (ref 0.17–1.22)
MONOCYTES NFR BLD AUTO: 9 % (ref 4–12)
NEUTROPHILS # BLD AUTO: 2.48 THOUSANDS/ÂΜL (ref 1.85–7.62)
NEUTS SEG NFR BLD AUTO: 43 % (ref 43–75)
NONHDLC SERPL-MCNC: 119 MG/DL
NRBC BLD AUTO-RTO: 0 /100 WBCS
PLATELET # BLD AUTO: 264 THOUSANDS/UL (ref 149–390)
PMV BLD AUTO: 12.3 FL (ref 8.9–12.7)
POTASSIUM SERPL-SCNC: 4.2 MMOL/L (ref 3.5–5.3)
PROT SERPL-MCNC: 7.7 G/DL (ref 6.4–8.4)
RBC # BLD AUTO: 4.47 MILLION/UL (ref 3.81–5.12)
SODIUM SERPL-SCNC: 138 MMOL/L (ref 135–147)
TRIGL SERPL-MCNC: 59 MG/DL
TSH SERPL DL<=0.05 MIU/L-ACNC: 3.09 UIU/ML (ref 0.45–4.5)
WBC # BLD AUTO: 5.66 THOUSAND/UL (ref 4.31–10.16)

## 2022-10-25 PROCEDURE — 80061 LIPID PANEL: CPT

## 2022-10-25 PROCEDURE — 86803 HEPATITIS C AB TEST: CPT

## 2022-10-25 PROCEDURE — 84443 ASSAY THYROID STIM HORMONE: CPT

## 2022-10-25 PROCEDURE — 87389 HIV-1 AG W/HIV-1&-2 AB AG IA: CPT

## 2022-10-25 PROCEDURE — 85025 COMPLETE CBC W/AUTO DIFF WBC: CPT

## 2022-10-25 PROCEDURE — 80053 COMPREHEN METABOLIC PANEL: CPT

## 2022-10-25 PROCEDURE — 36415 COLL VENOUS BLD VENIPUNCTURE: CPT

## 2022-10-26 LAB — HIV 1+2 AB+HIV1 P24 AG SERPL QL IA: NORMAL

## 2022-11-02 ENCOUNTER — TELEPHONE (OUTPATIENT)
Dept: NEUROLOGY | Facility: CLINIC | Age: 42
End: 2022-11-02

## 2022-11-03 ENCOUNTER — PATIENT MESSAGE (OUTPATIENT)
Dept: FAMILY MEDICINE CLINIC | Facility: CLINIC | Age: 42
End: 2022-11-03

## 2022-11-03 DIAGNOSIS — D64.9 ANEMIA, UNSPECIFIED TYPE: Primary | ICD-10-CM

## 2022-11-04 ENCOUNTER — PATIENT MESSAGE (OUTPATIENT)
Dept: FAMILY MEDICINE CLINIC | Facility: CLINIC | Age: 42
End: 2022-11-04

## 2022-11-04 DIAGNOSIS — D64.9 ANEMIA, UNSPECIFIED TYPE: Primary | ICD-10-CM

## 2022-11-25 PROBLEM — Z00.00 ROUTINE ADULT HEALTH MAINTENANCE: Status: RESOLVED | Noted: 2022-09-26 | Resolved: 2022-11-25

## 2023-03-10 ENCOUNTER — PATIENT MESSAGE (OUTPATIENT)
Dept: FAMILY MEDICINE CLINIC | Facility: CLINIC | Age: 43
End: 2023-03-10

## 2023-03-10 ENCOUNTER — AMB VIDEO VISIT (OUTPATIENT)
Dept: OTHER | Facility: HOSPITAL | Age: 43
End: 2023-03-10

## 2023-03-10 ENCOUNTER — TELEPHONE (OUTPATIENT)
Dept: FAMILY MEDICINE CLINIC | Facility: CLINIC | Age: 43
End: 2023-03-10

## 2023-03-10 DIAGNOSIS — J01.00 ACUTE NON-RECURRENT MAXILLARY SINUSITIS: Primary | ICD-10-CM

## 2023-03-10 RX ORDER — FLUTICASONE PROPIONATE 50 MCG
1 SPRAY, SUSPENSION (ML) NASAL DAILY
Qty: 9.9 ML | Refills: 0 | Status: SHIPPED | OUTPATIENT
Start: 2023-03-10

## 2023-03-10 RX ORDER — AMOXICILLIN AND CLAVULANATE POTASSIUM 875; 125 MG/1; MG/1
1 TABLET, FILM COATED ORAL EVERY 12 HOURS SCHEDULED
Qty: 20 TABLET | Refills: 0 | Status: SHIPPED | OUTPATIENT
Start: 2023-03-10 | End: 2023-03-20

## 2023-03-10 NOTE — TELEPHONE ENCOUNTER
Patient left message regarding scheduling an appointment for sinus issues for today  Left message to call back to discuss visit

## 2023-03-10 NOTE — PROGRESS NOTES
Video Visit - Duong Morales 38 y o  female MRN: [id number]    REQUIRED DOCUMENTATION:         1  This service was provided via [employer]  2  Provider located at [address] [place] [id number] [phone]  3  [facility] provider: Jacob Shin PA-C   4  Identify all parties in room with patient during [facility] visit:  No one else  5  After connecting through [employer], patient was identified by name and date of birth  Patient was then informed that this was a Telemedicine visit and that the exam was being conducted confidentially over secure lines  My office door was closed  No one else was in the room  Patient acknowledged consent and understanding of privacy and security of the Telemedicine visit  I informed the patient that I have reviewed their record in Epic and presented the opportunity for them to ask any questions regarding the visit today  The patient agreed to participate  HPI  Patient states that she has been sick for the past few days  She states she has pressure in her nose, ears, runny nose, stuffy nose  Sore throat has resolved but still coarse voice  She felt like she was starting with a fever last night but took motrin  She has not taken anything else  She denies any SOB, CP  Physical Exam  Constitutional:       General: She is not in acute distress  Appearance: Normal appearance  She is not ill-appearing, toxic-appearing or diaphoretic  HENT:      Head: Normocephalic and atraumatic  Nose: Congestion present  Comments: TTP over sinuses  Pulmonary:      Effort: Pulmonary effort is normal  No respiratory distress  Lymphadenopathy:      Cervical: Cervical adenopathy present  Skin:     General: Skin is dry  Neurological:      General: No focal deficit present  Mental Status: She is alert and oriented to person, place, and time     Psychiatric:         Mood and Affect: Mood normal          Behavior: Behavior normal  Diagnoses and all orders for this visit:    Acute non-recurrent maxillary sinusitis  -     amoxicillin-clavulanate (AUGMENTIN) 875-125 mg per tablet; Take 1 tablet by mouth every 12 (twelve) hours for 10 days  -     fluticasone (FLONASE) 50 mcg/act nasal spray; 1 spray into each nostril daily      Patient Instructions     Sinusitis   WHAT YOU NEED TO KNOW:   Sinusitis is inflammation or infection of your sinuses  Sinusitis is most often caused by a virus  Acute sinusitis may last up to 12 weeks  Chronic sinusitis lasts longer than 12 weeks  Recurrent sinusitis means you have 4 or more infections in 1 year  DISCHARGE INSTRUCTIONS:   Return to the emergency department if:   • You have trouble breathing or wheezing that is getting worse  • You have a stiff neck, a fever, or a bad headache  • You cannot open your eye  • Your eyeball bulges out or you cannot move your eye  • You are more sleepy than normal, or you notice changes in your ability to think, move, or talk  • You have swelling of your forehead or scalp  Call your doctor if:   • You have vision changes, such as double vision  • Your eye and eyelid are red, swollen, and painful  • Your symptoms do not improve or go away after 10 days  • You have nausea and are vomiting  • Your nose is bleeding  • You have questions or concerns about your condition or care  Medicines: Your symptoms may go away on their own  Your healthcare provider may recommend watchful waiting for up to 10 days before starting antibiotics  You may need any of the following:  • Acetaminophen  decreases pain and fever  It is available without a doctor's order  Ask how much to take and how often to take it  Follow directions  Read the labels of all other medicines you are using to see if they also contain acetaminophen, or ask your doctor or pharmacist  Acetaminophen can cause liver damage if not taken correctly      • NSAIDs , such as ibuprofen, help decrease swelling, pain, and fever  This medicine is available with or without a doctor's order  NSAIDs can cause stomach bleeding or kidney problems in certain people  If you take blood thinner medicine, always ask your healthcare provider if NSAIDs are safe for you  Always read the medicine label and follow directions  • Nasal steroid sprays  may help decrease inflammation in your nose and sinuses  • Decongestants  help reduce swelling and drain mucus in the nose and sinuses  They may help you breathe easier  • Antihistamines  help dry mucus in the nose and relieve sneezing  • Antibiotics  help treat or prevent a bacterial infection  • Take your medicine as directed  Contact your healthcare provider if you think your medicine is not helping or if you have side effects  Tell your provider if you are allergic to any medicine  Keep a list of the medicines, vitamins, and herbs you take  Include the amounts, and when and why you take them  Bring the list or the pill bottles to follow-up visits  Carry your medicine list with you in case of an emergency  Self-care:   • Rinse your sinuses as directed  Use a sinus rinse device to rinse your nasal passages with a saline (salt water) solution or distilled water  Do not use tap water  This will help thin the mucus in your nose and rinse away pollen and dirt  It will also help reduce swelling so you can breathe normally  • Use a humidifier  to increase air moisture in your home  This may make it easier for you to breathe and help decrease your cough  • Sleep with your head elevated  Place an extra pillow under your head before you go to sleep to help your sinuses drain  • Drink liquids as directed  Ask your healthcare provider how much liquid to drink each day and which liquids are best for you  Liquids will thin the mucus in your nose and help it drain  Avoid drinks that contain alcohol or caffeine       • Do not smoke, and avoid secondhand smoke  Nicotine and other chemicals in cigarettes and cigars can make your symptoms worse  Ask your healthcare provider for information if you currently smoke and need help to quit  E-cigarettes or smokeless tobacco still contain nicotine  Talk to your healthcare provider before you use these products  Prevent the spread of germs:   • Wash your hands often with soap and water  Wash your hands after you use the bathroom, change a child's diaper, or sneeze  Wash your hands before you prepare or eat food  • Stay away from people who are sick  Some germs spread easily and quickly through contact  Follow up with your doctor as directed: You may be referred to an ear, nose, and throat specialist  Write down your questions so you remember to ask them during your visits  © Copyright [employer] 2022 Information is for End User's use only and may not be sold, redistributed or otherwise used for commercial purposes  The above information is an  only  It is not intended as medical advice for individual conditions or treatments  Talk to your doctor, nurse or pharmacist before following any medical regimen to see if it is safe and effective for you

## 2023-03-10 NOTE — PATIENT INSTRUCTIONS
Sinusitis   WHAT YOU NEED TO KNOW:   Sinusitis is inflammation or infection of your sinuses  Sinusitis is most often caused by a virus  Acute sinusitis may last up to 12 weeks  Chronic sinusitis lasts longer than 12 weeks  Recurrent sinusitis means you have 4 or more infections in 1 year  DISCHARGE INSTRUCTIONS:   Return to the emergency department if:   You have trouble breathing or wheezing that is getting worse  You have a stiff neck, a fever, or a bad headache  You cannot open your eye  Your eyeball bulges out or you cannot move your eye  You are more sleepy than normal, or you notice changes in your ability to think, move, or talk  You have swelling of your forehead or scalp  Call your doctor if:   You have vision changes, such as double vision  Your eye and eyelid are red, swollen, and painful  Your symptoms do not improve or go away after 10 days  You have nausea and are vomiting  Your nose is bleeding  You have questions or concerns about your condition or care  Medicines: Your symptoms may go away on their own  Your healthcare provider may recommend watchful waiting for up to 10 days before starting antibiotics  You may need any of the following:  Acetaminophen  decreases pain and fever  It is available without a doctor's order  Ask how much to take and how often to take it  Follow directions  Read the labels of all other medicines you are using to see if they also contain acetaminophen, or ask your doctor or pharmacist  Acetaminophen can cause liver damage if not taken correctly  NSAIDs , such as ibuprofen, help decrease swelling, pain, and fever  This medicine is available with or without a doctor's order  NSAIDs can cause stomach bleeding or kidney problems in certain people  If you take blood thinner medicine, always ask your healthcare provider if NSAIDs are safe for you  Always read the medicine label and follow directions      Nasal steroid sprays may help decrease inflammation in your nose and sinuses  Decongestants  help reduce swelling and drain mucus in the nose and sinuses  They may help you breathe easier  Antihistamines  help dry mucus in the nose and relieve sneezing  Antibiotics  help treat or prevent a bacterial infection  Take your medicine as directed  Contact your healthcare provider if you think your medicine is not helping or if you have side effects  Tell your provider if you are allergic to any medicine  Keep a list of the medicines, vitamins, and herbs you take  Include the amounts, and when and why you take them  Bring the list or the pill bottles to follow-up visits  Carry your medicine list with you in case of an emergency  Self-care:   Rinse your sinuses as directed  Use a sinus rinse device to rinse your nasal passages with a saline (salt water) solution or distilled water  Do not use tap water  This will help thin the mucus in your nose and rinse away pollen and dirt  It will also help reduce swelling so you can breathe normally  Use a humidifier  to increase air moisture in your home  This may make it easier for you to breathe and help decrease your cough  Sleep with your head elevated  Place an extra pillow under your head before you go to sleep to help your sinuses drain  Drink liquids as directed  Ask your healthcare provider how much liquid to drink each day and which liquids are best for you  Liquids will thin the mucus in your nose and help it drain  Avoid drinks that contain alcohol or caffeine  Do not smoke, and avoid secondhand smoke  Nicotine and other chemicals in cigarettes and cigars can make your symptoms worse  Ask your healthcare provider for information if you currently smoke and need help to quit  E-cigarettes or smokeless tobacco still contain nicotine  Talk to your healthcare provider before you use these products      Prevent the spread of germs:   Wash your hands often with soap and water  Wash your hands after you use the bathroom, change a child's diaper, or sneeze  Wash your hands before you prepare or eat food  Stay away from people who are sick  Some germs spread easily and quickly through contact  Follow up with your doctor as directed: You may be referred to an ear, nose, and throat specialist  Write down your questions so you remember to ask them during your visits  © Copyright [employer] 2022 Information is for End User's use only and may not be sold, redistributed or otherwise used for commercial purposes  The above information is an  only  It is not intended as medical advice for individual conditions or treatments  Talk to your doctor, nurse or pharmacist before following any medical regimen to see if it is safe and effective for you

## 2023-03-10 NOTE — CARE ANYWHERE EVISITS
Visit Summary for [facility] - Gender: Female - Date of Birth: [id number]  Date: [id number] - Duration: 3 minutes  Patient: [facility]  Provider: Antonio Harris PA-C    Patient Contact Information  Address  [address] [place] [id number]  [phone]    Visit Topics  Possible sinus infection [Added By: Self - 2023-03-10]    Triage Questions   What is your current physical address in the event of a medical emergency? Answer []  Are you allergic to any medications? Answer []  Are you now or could you be pregnant? Answer []  Do you have any immune system compromise or chronic lung   disease? Answer []  Do you have any vulnerable family members in the home (infant, pregnant, cancer, elderly)? Answer []     Conversation Transcripts  [0A][0A] [Notification] You are connected with Antonio Harris PA-C, Urgent Care Specialist [0A][Notification] [facility] is located in [address] [place] [address]  [0A][Notification] Elissachadd Vasques has shared health history  Daisy Platt  [0A]    Diagnosis  Acute maxillary sinusitis, unspecified    Procedures  Value: [id number] Code: CPT-4 UNLISTED E&M SERVICE    Medications Prescribed    No prescriptions ordered    Electronically signed by: Sera Caldwell(NPI [id number])

## 2023-07-04 DIAGNOSIS — G43.009 MIGRAINE WITHOUT AURA AND WITHOUT STATUS MIGRAINOSUS, NOT INTRACTABLE: ICD-10-CM

## 2023-07-06 RX ORDER — PROPRANOLOL HYDROCHLORIDE 80 MG/1
CAPSULE, EXTENDED RELEASE ORAL
Qty: 90 CAPSULE | Refills: 0 | Status: SHIPPED | OUTPATIENT
Start: 2023-07-06

## 2023-09-21 ENCOUNTER — PATIENT MESSAGE (OUTPATIENT)
Dept: FAMILY MEDICINE CLINIC | Facility: CLINIC | Age: 43
End: 2023-09-21

## 2023-09-21 DIAGNOSIS — G43.009 MIGRAINE WITHOUT AURA AND WITHOUT STATUS MIGRAINOSUS, NOT INTRACTABLE: Primary | ICD-10-CM

## 2023-10-14 DIAGNOSIS — G43.009 MIGRAINE WITHOUT AURA AND WITHOUT STATUS MIGRAINOSUS, NOT INTRACTABLE: ICD-10-CM

## 2023-10-16 RX ORDER — PROPRANOLOL HYDROCHLORIDE 80 MG/1
CAPSULE, EXTENDED RELEASE ORAL
Qty: 90 CAPSULE | Refills: 0 | Status: SHIPPED | OUTPATIENT
Start: 2023-10-16 | End: 2023-10-20

## 2023-10-20 ENCOUNTER — OFFICE VISIT (OUTPATIENT)
Dept: FAMILY MEDICINE CLINIC | Facility: CLINIC | Age: 43
End: 2023-10-20

## 2023-10-20 VITALS
WEIGHT: 128.2 LBS | SYSTOLIC BLOOD PRESSURE: 118 MMHG | DIASTOLIC BLOOD PRESSURE: 70 MMHG | HEIGHT: 64 IN | OXYGEN SATURATION: 100 % | HEART RATE: 58 BPM | RESPIRATION RATE: 14 BRPM | TEMPERATURE: 98.7 F | BODY MASS INDEX: 21.89 KG/M2

## 2023-10-20 DIAGNOSIS — Z13.6 SCREENING FOR CARDIOVASCULAR CONDITION: ICD-10-CM

## 2023-10-20 DIAGNOSIS — Z00.00 ROUTINE ADULT HEALTH MAINTENANCE: Primary | ICD-10-CM

## 2023-10-20 DIAGNOSIS — Z13.29 SCREENING FOR THYROID DISORDER: ICD-10-CM

## 2023-10-20 DIAGNOSIS — Z12.11 SCREENING FOR COLON CANCER: ICD-10-CM

## 2023-10-20 DIAGNOSIS — G43.009 MIGRAINE WITHOUT AURA AND WITHOUT STATUS MIGRAINOSUS, NOT INTRACTABLE: ICD-10-CM

## 2023-10-20 DIAGNOSIS — Z23 ENCOUNTER FOR IMMUNIZATION: ICD-10-CM

## 2023-10-20 RX ORDER — PROPRANOLOL HCL 60 MG
60 CAPSULE, EXTENDED RELEASE 24HR ORAL DAILY
Qty: 90 CAPSULE | Refills: 0 | Status: SHIPPED | OUTPATIENT
Start: 2023-10-20

## 2023-10-20 NOTE — PATIENT INSTRUCTIONS
Decrease propanolol 60 mg daily. Follow up with neurology. Follow up if worsening symptoms of headaches or no improvement of slow heart rate with changes.

## 2023-10-20 NOTE — PROGRESS NOTES
[address] [address] GROUP    NAME: Anthony Carvajal  AGE: 37 y.o. SEX: female  : 1980     DATE: 10/20/2023     Assessment and Plan:     Problem List Items Addressed This Visit       Routine adult health maintenance - Primary     Advised on healthy diet and exercise; flu vaccine updated; advised on pap and colon cancer screening; mammogram scheduled; reviewed preventative measures; check labs         Migraine without aura and without status migrainosus, not intractable     Decrease dosage of propanolol due to symptomatic bradycardia; discussed changing medication vs lowering dosage; pt prefers first trial of lower dosage, if still having symptoms recommend d/c'ing propanolol and will trial amitriptyline; pt had SE to topamax and did not tolerate medication; referral placed for neurology as she would like to be evaluated for other options         Relevant Medications    propranolol (INDERAL LA) 60 mg 24 hr capsule    Other Relevant Orders    CBC and differential    Comprehensive metabolic panel    TSH, 3rd generation with Free T4 reflex     Other Visit Diagnoses       Screening for colon cancer        Relevant Orders    Ambulatory Referral to Gastroenterology    Encounter for immunization        Relevant Orders    influenza vaccine, quadrivalent, 0.5 mL, preservative-free, for adult and pediatric patients 6 mos+ (AFLURIA, FLUARIX, FLULAVAL, FLUZONE) (Completed)    Screening for cardiovascular condition        Relevant Orders    Comprehensive metabolic panel    Lipid Panel with Direct LDL reflex    Screening for thyroid disorder        Relevant Orders    TSH, 3rd generation with Free T4 reflex            Immunizations and preventive care screenings were discussed with patient today. Appropriate education was printed on patient's after visit summary.     Counseling:  Dental Health: discussed importance of regular tooth brushing, flossing, and dental visits. Exercise: the importance of regular exercise/physical activity was discussed. Recommend exercise 3-5 times per week for at least 30 minutes. Depression Screening and Follow-up Plan: Patient was screened for depression during today's encounter. They screened negative with a PHQ-2 score of 0. Return if symptoms worsen or fail to improve. Chief Complaint:     Chief Complaint   Patient presents with    Physical Exam      History of Present Illness:     Adult Annual Physical   Patient here for a comprehensive physical exam. The patient reports problems - migraines . Patient is trying to schedule with neurology. Would like to discuss other tx options as she feels her heart rate feels too slow on the propanolol. Tried topamax and had SE. Afraid to come off prophylactic due to severity of migraines. Diet and Physical Activity  Diet/Nutrition: well balanced diet. Exercise: moderate cardiovascular exercise. Depression Screening  PHQ-2/9 Depression Screening    Little interest or pleasure in doing things: 0 - not at all  Feeling down, depressed, or hopeless: 0 - not at all  PHQ-2 Score: 0  PHQ-2 Interpretation: Negative depression screen       General Health  Sleep: gets 7-8 hours of sleep on average. Hearing: normal - bilateral.  Vision: goes for regular eye exams and wears glasses. Dental: regular dental visits. /GYN Health  Patient is: premenopausal  Contraceptive method:  none currently . -Mammogram is scheduled  -Pap was WNL in 2018 and due this year; follows with [address] you have an advanced directive? no  Do you have a durable medical power of ? no     Review of Systems:     Review of Systems   Constitutional:  Negative for chills and fever. Respiratory:  Negative for shortness of breath. Cardiovascular:  Negative for chest pain. Neurological:  Positive for headaches. Past Medical History:     History reviewed.  No pertinent past medical history. Past Surgical History:     Past Surgical History:   Procedure Laterality Date    NO PAST SURGERIES        Social History:     Social History     Socioeconomic History    Marital status: /Civil Union     Spouse name: None    Number of children: None    Years of education: None    Highest education level: None   Occupational History    None   Tobacco Use    Smoking status: Never    Smokeless tobacco: Never   Vaping Use    Vaping Use: Never used   Substance and Sexual Activity    Alcohol use: No    Drug use: No    Sexual activity: None   Other Topics Concern    None   Social History Narrative    Occasional alcohol use - As per [employer]     Consumes 1 cup of coffee per day     Social Determinants of Health     Financial Resource Strain: Not on file   Food Insecurity: Not on file   Transportation Needs: Not on file   Physical Activity: Not on file   Stress: Not on file   Social Connections: Not on file   Intimate Partner Violence: Not on file   Housing Stability: Not on file      Family History:     Family History   Problem Relation Age of Onset    No Known Problems Mother     No Known Problems Father     No Known Problems Sister     No Known Problems Brother     No Known Problems Sister       Current Medications:     Current Outpatient Medications   Medication Sig Dispense Refill    fluticasone (FLONASE) 50 mcg/act nasal spray 1 spray into each nostril daily 9.9 mL 0    propranolol (INDERAL LA) 60 mg 24 hr capsule Take 1 capsule (60 mg total) by mouth daily 90 capsule 0     No current facility-administered medications for this visit. Allergies:     No Known Allergies   Physical Exam:     /70 (BP Location: Left arm, Patient Position: Sitting, Cuff Size: Standard)   Pulse 58   Temp 98.7 °F (37.1 °C)   Resp 14   Ht 5' 3.75" (1.619 m)   Wt 58.2 kg (128 lb 3.2 oz)   SpO2 100%   BMI 22.18 kg/m²     Physical Exam  Vitals reviewed.    Constitutional:       General: She is not in acute distress. Appearance: Normal appearance. She is normal weight. She is not ill-appearing or toxic-appearing. HENT:      Head: Normocephalic and atraumatic. Right Ear: Tympanic membrane, ear canal and external ear normal. There is no impacted cerumen. Left Ear: Tympanic membrane, ear canal and external ear normal. There is no impacted cerumen. Nose: Nose normal.      Mouth/Throat:      Mouth: Mucous membranes are moist.      Pharynx: No oropharyngeal exudate or posterior oropharyngeal erythema. Eyes:      General: No scleral icterus. Left eye: No discharge. Conjunctiva/sclera: Conjunctivae normal.      Pupils: Pupils are equal, round, and reactive to light. Neck:      Thyroid: No thyroid mass, thyromegaly or thyroid tenderness. Cardiovascular:      Rate and Rhythm: Normal rate and regular rhythm. Heart sounds: Normal heart sounds. No murmur heard. Pulmonary:      Effort: Pulmonary effort is normal. No respiratory distress. Breath sounds: Normal breath sounds. No wheezing, rhonchi or rales. Abdominal:      General: Abdomen is flat. Palpations: There is no mass. Tenderness: There is no abdominal tenderness. There is no guarding. Hernia: No hernia is present. Musculoskeletal:         General: No swelling. Cervical back: Neck supple. No muscular tenderness. Lymphadenopathy:      Cervical: No cervical adenopathy. Skin:     Findings: No rash. Neurological:      Mental Status: She is alert and oriented to person, place, and time. Psychiatric:         Mood and Affect: Mood normal.         Behavior: Behavior normal.         Thought Content:  Thought content normal.         Judgment: Judgment normal.          Link Tony,   [facility]

## 2023-10-20 NOTE — ASSESSMENT & PLAN NOTE
Decrease dosage of propanolol due to symptomatic bradycardia; discussed changing medication vs lowering dosage; pt prefers first trial of lower dosage, if still having symptoms recommend d/c'ing propanolol and will trial amitriptyline; pt had SE to topamax and did not tolerate medication; referral placed for neurology as she would like to be evaluated for other options

## 2023-10-20 NOTE — ASSESSMENT & PLAN NOTE
Advised on healthy diet and exercise; flu vaccine updated; advised on pap and colon cancer screening; mammogram scheduled; reviewed preventative measures; check labs

## 2023-10-23 ENCOUNTER — TELEPHONE (OUTPATIENT)
Dept: FAMILY MEDICINE CLINIC | Facility: CLINIC | Age: 43
End: 2023-10-23

## 2023-10-23 NOTE — TELEPHONE ENCOUNTER
"Hi, this is Sha Melendez. I was there for an appointment on Friday and I got a referral for Gastroenterology, enterology and I am looking at the paper and I don't see where I was being actually referred to. It just has your information on there and that I was being referred, but I don't see where I was being referred to. So I'm assuming it's [facility] for in [place], but I am not sure. So if someone could please give me a call back, I'd appreciate it. My call back number is [phone]. Thank you."    Left general message the referral placed is an open referral and that department does their own outeach. They are currently calling referrals from May but she can certainly contact any of the offices in the network to try to schedule or let us know if she needs a referral to an outside facility.

## 2023-11-01 ENCOUNTER — PREP FOR PROCEDURE (OUTPATIENT)
Age: 43
End: 2023-11-01

## 2023-11-01 ENCOUNTER — TELEPHONE (OUTPATIENT)
Age: 43
End: 2023-11-01

## 2023-11-01 ENCOUNTER — APPOINTMENT (OUTPATIENT)
Dept: LAB | Facility: CLINIC | Age: 43
End: 2023-11-01
Payer: COMMERCIAL

## 2023-11-01 DIAGNOSIS — G43.009 MIGRAINE WITHOUT AURA AND WITHOUT STATUS MIGRAINOSUS, NOT INTRACTABLE: ICD-10-CM

## 2023-11-01 DIAGNOSIS — Z13.6 SCREENING FOR CARDIOVASCULAR CONDITION: ICD-10-CM

## 2023-11-01 DIAGNOSIS — Z13.29 SCREENING FOR THYROID DISORDER: ICD-10-CM

## 2023-11-01 DIAGNOSIS — Z12.11 COLON CANCER SCREENING: Primary | ICD-10-CM

## 2023-11-01 DIAGNOSIS — Z12.11 SCREENING FOR COLON CANCER: Primary | ICD-10-CM

## 2023-11-01 LAB
ALBUMIN SERPL BCP-MCNC: 4.2 G/DL (ref 3.5–5)
ALP SERPL-CCNC: 44 U/L (ref 34–104)
ALT SERPL W P-5'-P-CCNC: 15 U/L (ref 7–52)
ANION GAP SERPL CALCULATED.3IONS-SCNC: 7 MMOL/L
AST SERPL W P-5'-P-CCNC: 17 U/L (ref 13–39)
BASOPHILS # BLD AUTO: 0.04 THOUSANDS/ÂΜL (ref 0–0.1)
BASOPHILS NFR BLD AUTO: 1 % (ref 0–1)
BILIRUB SERPL-MCNC: 0.37 MG/DL (ref 0.2–1)
BUN SERPL-MCNC: 10 MG/DL (ref 5–25)
CALCIUM SERPL-MCNC: 8.9 MG/DL (ref 8.4–10.2)
CHLORIDE SERPL-SCNC: 104 MMOL/L (ref 96–108)
CHOLEST SERPL-MCNC: 175 MG/DL
CO2 SERPL-SCNC: 26 MMOL/L (ref 21–32)
CREAT SERPL-MCNC: 0.77 MG/DL (ref 0.6–1.3)
EOSINOPHIL # BLD AUTO: 0.16 THOUSAND/ÂΜL (ref 0–0.61)
EOSINOPHIL NFR BLD AUTO: 3 % (ref 0–6)
ERYTHROCYTE [DISTWIDTH] IN BLOOD BY AUTOMATED COUNT: 13.5 % (ref 11.6–15.1)
GFR SERPL CREATININE-BSD FRML MDRD: 93 ML/MIN/1.73SQ M
GLUCOSE P FAST SERPL-MCNC: 80 MG/DL (ref 65–99)
HCT VFR BLD AUTO: 39.3 % (ref 34.8–46.1)
HDLC SERPL-MCNC: 68 MG/DL
HGB BLD-MCNC: 12.6 G/DL (ref 11.5–15.4)
IMM GRANULOCYTES # BLD AUTO: 0.01 THOUSAND/UL (ref 0–0.2)
IMM GRANULOCYTES NFR BLD AUTO: 0 % (ref 0–2)
LDLC SERPL CALC-MCNC: 97 MG/DL (ref 0–100)
LYMPHOCYTES # BLD AUTO: 2.03 THOUSANDS/ÂΜL (ref 0.6–4.47)
LYMPHOCYTES NFR BLD AUTO: 38 % (ref 14–44)
MCH RBC QN AUTO: 29.3 PG (ref 26.8–34.3)
MCHC RBC AUTO-ENTMCNC: 32.1 G/DL (ref 31.4–37.4)
MCV RBC AUTO: 91 FL (ref 82–98)
MONOCYTES # BLD AUTO: 0.43 THOUSAND/ÂΜL (ref 0.17–1.22)
MONOCYTES NFR BLD AUTO: 8 % (ref 4–12)
NEUTROPHILS # BLD AUTO: 2.75 THOUSANDS/ÂΜL (ref 1.85–7.62)
NEUTS SEG NFR BLD AUTO: 50 % (ref 43–75)
NRBC BLD AUTO-RTO: 0 /100 WBCS
PLATELET # BLD AUTO: 238 THOUSANDS/UL (ref 149–390)
PMV BLD AUTO: 12 FL (ref 8.9–12.7)
POTASSIUM SERPL-SCNC: 4 MMOL/L (ref 3.5–5.3)
PROT SERPL-MCNC: 6.9 G/DL (ref 6.4–8.4)
RBC # BLD AUTO: 4.3 MILLION/UL (ref 3.81–5.12)
SODIUM SERPL-SCNC: 137 MMOL/L (ref 135–147)
TRIGL SERPL-MCNC: 48 MG/DL
TSH SERPL DL<=0.05 MIU/L-ACNC: 3.15 UIU/ML (ref 0.45–4.5)
WBC # BLD AUTO: 5.42 THOUSAND/UL (ref 4.31–10.16)

## 2023-11-01 PROCEDURE — 80053 COMPREHEN METABOLIC PANEL: CPT

## 2023-11-01 PROCEDURE — 84443 ASSAY THYROID STIM HORMONE: CPT

## 2023-11-01 PROCEDURE — 36415 COLL VENOUS BLD VENIPUNCTURE: CPT

## 2023-11-01 PROCEDURE — 85025 COMPLETE CBC W/AUTO DIFF WBC: CPT

## 2023-11-01 PROCEDURE — 80061 LIPID PANEL: CPT

## 2023-11-01 NOTE — TELEPHONE ENCOUNTER
11/01/23  Screened by: Kathy Burgos    Referring Provider Lanie Parra    Pre- Screening: Body mass index is 22.18 kg/m². Has patient been referred for a routine screening Colonoscopy? yes  Is the patient between 43-73 years old? yes      Previous Colonoscopy no   If yes:    Date:     Facility:     Reason:       SCHEDULING STAFF: If the patient is between 45yrs-49yrs, please advise patient to confirm benefits/coverage with their insurance company for a routine screening colonoscopy, some insurance carriers will only cover at [facility] or [facility]. If the patient is over 66years old, please schedule an office visit. Does the patient want to see a Gastroenterologist prior to their procedure OR are they having any GI symptoms? no    Has the patient been hospitalized or had abdominal surgery in the past 6 months? no    Does the patient use supplemental oxygen? no    Does the patient take Coumadin, Lovenox, Plavix, Elliquis, Xarelto, or other blood thinning medication? no    Has the patient had a stroke, cardiac event, or stent placed in the past year? no    Patient passed OA     SCHEDULING STAFF: If patient answers NO to above questions, then schedule procedure. If patient answers YES to above questions, then schedule office appointment. If patient is between 45yrs - 49yrs, please advise patient that we will have to confirm benefits & coverage with their insurance company for a routine screening colonoscopy.

## 2023-11-01 NOTE — TELEPHONE ENCOUNTER
Scheduled date of colonoscopy (as of today): 11/30/23  Physician performing colonoscopy:   Location of colonoscopy:  [facility]   Bowel prep reviewed with patient:   Instructions reviewed with patient by:  Clearances:     Patient is asking if Subtab can be prescribed instead of using Miralax Dulcolax prep. [facility] and patient will need prep instructions sent via [employer].

## 2023-11-02 RX ORDER — SOD SULF/POT CHLORIDE/MAG SULF 1.479 G
1 TABLET ORAL ONCE
Qty: 24 TABLET | Refills: 0 | Status: SHIPPED | OUTPATIENT
Start: 2023-11-02 | End: 2023-11-02

## 2023-11-03 NOTE — TELEPHONE ENCOUNTER
Called the pt and left the messeges regarding the Sutab medication and sent the colonoscopy prep for Sutab. Provider callback number if any questions.

## 2023-11-13 ENCOUNTER — TELEPHONE (OUTPATIENT)
Dept: GASTROENTEROLOGY | Facility: MEDICAL CENTER | Age: 43
End: 2023-11-13

## 2023-11-17 ENCOUNTER — ANESTHESIA EVENT (OUTPATIENT)
Dept: ANESTHESIOLOGY | Facility: HOSPITAL | Age: 43
End: 2023-11-17

## 2023-11-17 ENCOUNTER — ANESTHESIA (OUTPATIENT)
Dept: ANESTHESIOLOGY | Facility: HOSPITAL | Age: 43
End: 2023-11-17

## 2023-11-17 ENCOUNTER — TELEPHONE (OUTPATIENT)
Age: 43
End: 2023-11-17

## 2023-11-17 NOTE — TELEPHONE ENCOUNTER
Patient contacted office with procedure questions. All questions were answered. Patient will contact our office with any further questions.

## 2023-11-20 NOTE — PROGRESS NOTES
[facility]  Neurological Services Consult Note    Patient Name: Kya Mon  : 1980    MRN: [id number]    CONSULTING PROVIDER:  Kaykay Saunders DO  [address]  [address]  [address]      Assessment/Plan:    1. Migraine without aura and without status migrainosus, not intractable  Ambulatory Referral to Neurology    ECG 12 lead    rizatriptan (Maxalt) 10 mg tablet      2. Bradycardia  ECG 12 lead        Silvia Burgess is a pleasant 37year old female with PMH anxiety presenting for migraines. Her symptoms do sound consistent with episodic migraine. Unfortunately, while propranolol sounds to have been very effective in controlling her migraines, she has recently started experiencing side effects from this, which do sound cardiac in nature; furthermore, she has a quite low resting heart rate on exam today. After a long discussion, she would like to continue with her propranolol at current dose, which I am okay with, as this is lower than it was, and she has not had any for the episodes of the "heart slowing down" sensation that she had had previously since the decrease. For now, she will continue with 60 mg daily. In addition, I will start her on Maxalt as her abortive therapy, as she is quite adamant that she would be able to differentiate a "full migraine" from a more mild headache early enough that she can take the medication with good effect. Proper dosing and administration as well as side effects of medications were reviewed today  I have placed an order for an ECG, to evaluate baseline heart rhythm characteristics, potentially in anticipation of changing to an alternative preventative therapy  We discussed possible alternative preventatives today including tizanidine and Elavil, either of which I feel would be a good option for her.   I would like to see her back in ~6 months, and she is to reach out sooner if needed Thank you for allowing me to participate in the care of your patient. If there are any questions regarding evaluation please feel free to reach out.       ________________________________________________________________________________________________    Subjective:  Chief Complaint   Patient presents with    Migraine       37 y.o. right - handed female with PMH anxiety presenting for migraines. Several years ago, decided to get on medication for her migraines, due to how frequent they were. Saw an allergist due to feeling that it was all sinus-related. Workup negative, labelled as full migraines. Was placed on propranolol; lowered recently because she "doesn't like that she's on a pill every day." In addition, she started feeling like her heart will sometimes "slow down, like areally slow pump," which gets her nervous. She ends up "hitting her chest" slightly, lasts ~30 seconds, goes back to normal. Sometimes feels lightheaded or dizzy. After dose was lowered ~2 weeks ago, she has not had it happen again. Headaches are described as follows:  Has had headaches for: Since teenager. Denies significant changes sine they started. At that time, was getting "nasty ones" ~1x/month, 1-3 "mild" per week. "Mild"  Location: Across forehead, bitemporal. Pressure around eyes. When worse, feels like her entire head is under pressure underwater. Character: Dull throb  Severity: 3/10  Frequency: 3 per week  Length: 1 day, "if I handle it" (motrin, tylenol); can last multiple days if she doesn't handle, and can turn into the worse one  Associated and exacerbating symptoms: Sometimes nausea    "Migraine" (worse headache)  Location: Across forehead, bitemporal. Pressure around eyes. When worse, feels like her entire head is under pressure underwater.    Character: Pulsating pressure  Severity: 8-10/10  Frequency: None on full-dose propranolol; since med lowering, has had 1 (1 in 2 weeks). Length: 1 day; in the past it has been up to multiple days. Associated and exacerbating symptoms: photosensitivity, dizzy/lightheaded, nausea with vomiting, movement/position worsening  Aura: Denies  Triggers: Dehydration, missing meals, any amount of alcohol      Water intake: Several 16ox bottles per day  Caffeine Intake: 1 cup coffee per day  Sleep: Does snore, no nocturnal awakenings "unless my  wakes me up because I'm snoring." Tells me she was tested for sleep apnea "twice," never was told she has it. "Within the last 3 years."  Meals: 3 meals per day    Medications:  Abortive:    Current: Motrin, Tylenol (lowers/dulls pain)    Prior: "OTC" migraine stuff (with caffeine, "just makes me jittery")  Prophylactic:     Current: Propranolol 60mg    Prior: Propranolol 120mg (side effects, "heart slowing down.")          Pertinent Medications: Propranolol 60 mg daily    Current Outpatient Medications   Medication Sig Dispense Refill    fluticasone (FLONASE) 50 mcg/act nasal spray 1 spray into each nostril daily 9.9 mL 0    propranolol (INDERAL LA) 60 mg 24 hr capsule Take 1 capsule (60 mg total) by mouth daily 90 capsule 0    rizatriptan (Maxalt) 10 mg tablet Take 1 tablet (10 mg total) by mouth as needed for migraine Take at the onset of migraine; if symptoms continue or return, may take another dose at least 2 hours after first dose. Take no more than 2 doses in a day. 18 tablet 2     No current facility-administered medications for this visit.        No Known Allergies    Past Medical History:   Diagnosis Date    Headache, tension-type     Migraine     :   Past Surgical History:   Procedure Laterality Date    NO PAST SURGERIES       Social History     Socioeconomic History    Marital status: /Civil Union     Spouse name: Not on file    Number of children: Not on file    Years of education: Not on file    Highest education level: Not on file   Occupational History    Not on file   Tobacco Use Smoking status: Never    Smokeless tobacco: Never   Vaping Use    Vaping Use: Never used   Substance and Sexual Activity    Alcohol use: No    Drug use: No    Sexual activity: Yes     Partners: Male     Birth control/protection: Other     Comment:  had vasectomy   Other Topics Concern    Not on file   Social History Narrative    Occasional alcohol use - As per [employer]     Consumes 1 cup of coffee per day     Social Determinants of Health     Financial Resource Strain: Not on file   Food Insecurity: Not on file   Transportation Needs: Not on file   Physical Activity: Not on file   Stress: Not on file   Social Connections: Not on file   Intimate Partner Violence: Not on file   Housing Stability: Not on file      Family History   Problem Relation Age of Onset    No Known Problems Mother     No Known Problems Father     Migraines Sister     No Known Problems Brother     No Known Problems Sister     Migraines Sister        Review of Symptoms:      10-point system review completed, all of which are negative except as mentioned above.         Objective:  Physical Exam:      /66 (BP Location: Right arm, Patient Position: Sitting, Cuff Size: Standard)   Temp 98.7 °F (37.1 °C) (Temporal)   Ht 5' 3.75" (1.619 m)   Wt 56.7 kg (125 lb)   BMI 21.62 kg/m²  HR 44 bpm.    Gen: A&O x 4, NAD, cooperative  HEENT: NC/AT, EOMI, PERRL, mmm, no carotid bruits, neck supple, no meningeal signs  Heart: RRR, S1S2  Lungs: CTAB  Abd: soft/NT/ND, +BS  Ext: no edema, no calf tenderness b/l  Endo: no thyromegaly  Psych: no depression or anxiety apparent   Skin: no rashes or lesions    Neurologic Exam:  Mental Status: A&O x 4, NAD, speech clear, language fluent, comprehension intact, repetition and naming intact    Cranial Nerve Exam:   CN II-XII intact: No papilledema on fundoscopic examination, PERRL, VFF, no nystagmus, no gaze paresis, sensation V1-V3 intact b/l, muscles of facial expression symmetric; hearing intact to conversational tone, palate elevates symmetrically, shoulder elevation symmetric and tongue protrudes midline with movement side to side. Motor Exam:       Strength 5/5 UE's/LE's b/l  Tone and bulk normal   No pronator drift    Deep Tendon Reflexes: 2/4 biceps, triceps, brachioradialis, patellar, and achilles b/l, flexor plantar responses b/l    Sensation: Intact light touch/temperature/pinprick/vibration UE's/LE's b/l    Coordination/Cerebellum:       Tremors--none      Rapidly alternating movements: no dysdiadochokinesia b/l                Heel-to-Shin: no dysmetria b/l      Finger-to-Nose: no dysmetria b/l    Gait and stance:      Gait: Normal casual, tiptoe, heel walk, and tandem gait. No evidence of ataxia present. Labs:  11/1/2023:  CBC: WNL   CMP: WNL   TSH: WNL      Imaging/Procedures:   No pertinent imaging studies          I have spent a total time of >55 minutes on 11/21/23 in caring for this patient including Prognosis, Risks and benefits of tx options, Instructions for management, Patient and family education, Importance of tx compliance, Risk factor reductions, Documenting in the medical record, Reviewing / ordering tests, medicine, procedures  , and Obtaining or reviewing history  . Electronically signed by:    Glenis Olp Delinda Dubin [address] [facility]  [address] [place] [address]  11/21/23

## 2023-11-21 ENCOUNTER — OFFICE VISIT (OUTPATIENT)
Dept: LAB | Facility: CLINIC | Age: 43
End: 2023-11-21
Payer: COMMERCIAL

## 2023-11-21 ENCOUNTER — APPOINTMENT (OUTPATIENT)
Dept: LAB | Facility: CLINIC | Age: 43
End: 2023-11-21
Payer: COMMERCIAL

## 2023-11-21 ENCOUNTER — CONSULT (OUTPATIENT)
Dept: NEUROLOGY | Facility: CLINIC | Age: 43
End: 2023-11-21
Payer: COMMERCIAL

## 2023-11-21 VITALS
DIASTOLIC BLOOD PRESSURE: 66 MMHG | HEIGHT: 64 IN | WEIGHT: 125 LBS | SYSTOLIC BLOOD PRESSURE: 108 MMHG | BODY MASS INDEX: 21.34 KG/M2 | TEMPERATURE: 98.7 F

## 2023-11-21 DIAGNOSIS — R00.1 BRADYCARDIA: ICD-10-CM

## 2023-11-21 DIAGNOSIS — G43.009 MIGRAINE WITHOUT AURA AND WITHOUT STATUS MIGRAINOSUS, NOT INTRACTABLE: ICD-10-CM

## 2023-11-21 DIAGNOSIS — G43.009 MIGRAINE WITHOUT AURA AND WITHOUT STATUS MIGRAINOSUS, NOT INTRACTABLE: Primary | ICD-10-CM

## 2023-11-21 LAB
ATRIAL RATE: 45 BPM
P AXIS: 34 DEGREES
PR INTERVAL: 144 MS
QRS AXIS: 52 DEGREES
QRSD INTERVAL: 70 MS
QT INTERVAL: 462 MS
QTC INTERVAL: 399 MS
T WAVE AXIS: 43 DEGREES
VENTRICULAR RATE: 45 BPM

## 2023-11-21 PROCEDURE — 93010 ELECTROCARDIOGRAM REPORT: CPT | Performed by: INTERNAL MEDICINE

## 2023-11-21 PROCEDURE — 93005 ELECTROCARDIOGRAM TRACING: CPT

## 2023-11-21 PROCEDURE — 99245 OFF/OP CONSLTJ NEW/EST HI 55: CPT | Performed by: STUDENT IN AN ORGANIZED HEALTH CARE EDUCATION/TRAINING PROGRAM

## 2023-11-21 RX ORDER — RIZATRIPTAN BENZOATE 10 MG/1
10 TABLET ORAL AS NEEDED
Qty: 18 TABLET | Refills: 2 | Status: SHIPPED | OUTPATIENT
Start: 2023-11-21

## 2023-11-21 NOTE — PROGRESS NOTES
Review of Systems   Constitutional: Negative. HENT: Negative. Eyes: Negative. Respiratory: Negative. Cardiovascular: Negative. Gastrointestinal: Negative. Endocrine: Negative. Genitourinary: Negative. Musculoskeletal: Negative. Skin: Negative. Allergic/Immunologic: Negative. Neurological:  Positive for headaches. Hematological: Negative. Psychiatric/Behavioral: Negative.

## 2023-11-21 NOTE — PATIENT INSTRUCTIONS
-I do feel that your presentation is consistent with migraines. -Typically, we will attack migraines 3 ways: Lifestyle modifications, preventative medications, and abortive medications.     -Lifestyle: Stay well hydrated, try to eat 3 meals per day, ensure proper sleep amount and timing. --I would like to get an ECG, just for a baseline regarding your heart function overall. --Also try to keep a headache diary: this is a regular chronicle of the headaches, including details such as: character/type of pain, location on your head, severity (1-10), associated symptoms (light sensitivity, sound sensitivity, nausea, worsening with movement, etc.), and any triggers you could think of. Sometimes it can be helpful to document what you had to eat/drink that day, as many people can have food/ingestion-related migraine triggers.     -Preventative/prophylactic medication: This is a medication that you take regularly (daily, nightly, monthly, etc. depending on the medication), whether you have a headache or not, to prevent migraines from occurring. Sometimes it can take a number of weeks to start seeing an effect, as these medications need time to reach a steady state to then be effective. --In your case, we can continue the propranolol as it is for now; let me know if you need refills from me  --We also discussed alternatives: Zanaflex (tizanidine) and Elavil (amitriptyline):    ---Tizanidine is what is known as an antispasmodic agent, commonly prescribed for migraine prevention. Dosing typically starts at 10 mg, in a.m. increase in 2 mg intervals up to 6 mg maximum. Classically in migraine prevention, it is dosed at night, as one of the more common side effects is sedation. Adverse reactions can include drowsiness is sedated, dry mouth, constipation, dizziness, although is with most side effects these typically go away with time. Avoid alcohol due to additive sedation. ---We could consider amitriptyline.   This was originally an anti-depression medication which is excellent for migraine. It can increase tiredness so it is taken at bedtime. It can cause some dry mouth, dizziness, weight gain, some people may feel dizzy when standing up too fast.       -Abortive medication: This is a medication that you take to abort/get rid of a headache that "breaks through" the preventative medication. --In your case, I would like to start a medication called Maxalt (rizatriptan)   --1 tablet at the onset of headache, may take 1 more ~2 hours afterwards if the headache remains or has returned. No more than 2 in 24 hours, no more than 9 per month.      -I would like to see you back in ~3 months. Please reach out via [employer] sooner if needed or with any questions.

## 2023-11-29 RX ORDER — SODIUM CHLORIDE 9 MG/ML
125 INJECTION, SOLUTION INTRAVENOUS CONTINUOUS
Status: CANCELLED | OUTPATIENT
Start: 2023-11-29

## 2023-11-30 ENCOUNTER — HOSPITAL ENCOUNTER (OUTPATIENT)
Dept: GASTROENTEROLOGY | Facility: MEDICAL CENTER | Age: 43
Setting detail: OUTPATIENT SURGERY
End: 2023-11-30
Payer: COMMERCIAL

## 2023-11-30 ENCOUNTER — ANESTHESIA (OUTPATIENT)
Dept: GASTROENTEROLOGY | Facility: MEDICAL CENTER | Age: 43
End: 2023-11-30

## 2023-11-30 ENCOUNTER — ANESTHESIA EVENT (OUTPATIENT)
Dept: GASTROENTEROLOGY | Facility: MEDICAL CENTER | Age: 43
End: 2023-11-30

## 2023-11-30 VITALS
RESPIRATION RATE: 14 BRPM | DIASTOLIC BLOOD PRESSURE: 58 MMHG | SYSTOLIC BLOOD PRESSURE: 104 MMHG | OXYGEN SATURATION: 98 % | TEMPERATURE: 98.3 F | HEART RATE: 73 BPM | BODY MASS INDEX: 21.34 KG/M2 | WEIGHT: 125 LBS | HEIGHT: 64 IN

## 2023-11-30 DIAGNOSIS — Z12.11 SCREENING FOR COLON CANCER: ICD-10-CM

## 2023-11-30 LAB
EXT PREGNANCY TEST URINE: NEGATIVE
EXT. CONTROL: NORMAL

## 2023-11-30 PROCEDURE — 45380 COLONOSCOPY AND BIOPSY: CPT | Performed by: INTERNAL MEDICINE

## 2023-11-30 PROCEDURE — 88342 IMHCHEM/IMCYTCHM 1ST ANTB: CPT | Performed by: PATHOLOGY

## 2023-11-30 PROCEDURE — 88305 TISSUE EXAM BY PATHOLOGIST: CPT | Performed by: PATHOLOGY

## 2023-11-30 PROCEDURE — 45385 COLONOSCOPY W/LESION REMOVAL: CPT | Performed by: INTERNAL MEDICINE

## 2023-11-30 PROCEDURE — 45381 COLONOSCOPY SUBMUCOUS NJX: CPT | Performed by: INTERNAL MEDICINE

## 2023-11-30 PROCEDURE — 88360 TUMOR IMMUNOHISTOCHEM/MANUAL: CPT | Performed by: PATHOLOGY

## 2023-11-30 PROCEDURE — 81025 URINE PREGNANCY TEST: CPT | Performed by: STUDENT IN AN ORGANIZED HEALTH CARE EDUCATION/TRAINING PROGRAM

## 2023-11-30 PROCEDURE — 88341 IMHCHEM/IMCYTCHM EA ADD ANTB: CPT | Performed by: PATHOLOGY

## 2023-11-30 RX ORDER — SODIUM CHLORIDE 9 MG/ML
125 INJECTION, SOLUTION INTRAVENOUS CONTINUOUS
Status: DISCONTINUED | OUTPATIENT
Start: 2023-11-30 | End: 2023-12-04 | Stop reason: HOSPADM

## 2023-11-30 RX ORDER — PROPOFOL 10 MG/ML
INJECTION, EMULSION INTRAVENOUS AS NEEDED
Status: DISCONTINUED | OUTPATIENT
Start: 2023-11-30 | End: 2023-11-30

## 2023-11-30 RX ADMIN — SODIUM CHLORIDE 125 ML/HR: 0.9 INJECTION, SOLUTION INTRAVENOUS at 07:15

## 2023-11-30 RX ADMIN — PROPOFOL 50 MG: 10 INJECTION, EMULSION INTRAVENOUS at 07:56

## 2023-11-30 RX ADMIN — PROPOFOL 50 MG: 10 INJECTION, EMULSION INTRAVENOUS at 08:13

## 2023-11-30 RX ADMIN — PROPOFOL 50 MG: 10 INJECTION, EMULSION INTRAVENOUS at 08:08

## 2023-11-30 RX ADMIN — PROPOFOL 30 MG: 10 INJECTION, EMULSION INTRAVENOUS at 07:47

## 2023-11-30 RX ADMIN — PROPOFOL 30 MG: 10 INJECTION, EMULSION INTRAVENOUS at 07:34

## 2023-11-30 RX ADMIN — PROPOFOL 30 MG: 10 INJECTION, EMULSION INTRAVENOUS at 07:43

## 2023-11-30 RX ADMIN — PROPOFOL 110 MG: 10 INJECTION, EMULSION INTRAVENOUS at 07:30

## 2023-11-30 RX ADMIN — PROPOFOL 30 MG: 10 INJECTION, EMULSION INTRAVENOUS at 07:37

## 2023-11-30 RX ADMIN — PROPOFOL 40 MG: 10 INJECTION, EMULSION INTRAVENOUS at 07:50

## 2023-11-30 RX ADMIN — PROPOFOL 50 MG: 10 INJECTION, EMULSION INTRAVENOUS at 07:59

## 2023-11-30 RX ADMIN — PROPOFOL 50 MG: 10 INJECTION, EMULSION INTRAVENOUS at 08:02

## 2023-11-30 RX ADMIN — PROPOFOL 30 MG: 10 INJECTION, EMULSION INTRAVENOUS at 07:40

## 2023-11-30 RX ADMIN — PROPOFOL 50 MG: 10 INJECTION, EMULSION INTRAVENOUS at 08:17

## 2023-11-30 RX ADMIN — SODIUM CHLORIDE: 0.9 INJECTION, SOLUTION INTRAVENOUS at 07:52

## 2023-11-30 NOTE — H&P
History and Physical -  [facility]  Miguel Linn 37 y.o. female MRN: [id number]          HPI: Miguel Linn is a 37y.o. year old female who presents for colon cancer screening. REVIEW OF SYSTEMS: Per the HPI, and otherwise unremarkable. Historical Information   Past Medical History:   Diagnosis Date    Headache, tension-type     Migraine      Past Surgical History:   Procedure Laterality Date    NO PAST SURGERIES       Social History   Social History     Substance and Sexual Activity   Alcohol Use No     Social History     Substance and Sexual Activity   Drug Use No     Social History     Tobacco Use   Smoking Status Never   Smokeless Tobacco Never     Family History   Problem Relation Age of Onset    No Known Problems Mother     No Known Problems Father     Migraines Sister     No Known Problems Brother     No Known Problems Sister     Migraines Sister        Meds/Allergies     (Not in a hospital admission)      No Known Allergies    Objective     There were no vitals taken for this visit. PHYSICAL EXAMINATION:    General Appearance:   Alert, cooperative, no distress   HEENT:  Normocephalic, atraumatic, anicteric. Neck supple, symmetrical, trachea midline. Lungs:   Equal chest rise and unlabored breathing, normal effort, no coughing. Cardiovascular:   No visualized JVD. Abdomen:   No abdominal distension. Skin:   No jaundice, rashes, or lesions. Musculoskeletal:   Normal range of motion visualized. Psych:  Normal affect and normal insight. Neuro:  Alert and appropriate. ASSESSMENT/PLAN:  This is a 37y.o. year old female here for colonoscopy, and she is stable and optimized for her procedure.

## 2023-11-30 NOTE — ANESTHESIA PREPROCEDURE EVALUATION
Procedure:  COLONOSCOPY    Relevant Problems   CARDIO   (+) Migraine without aura and without status migrainosus, not intractable      NEURO/PSYCH   (+) Anxiety   (+) Migraine without aura and without status migrainosus, not intractable      Other   (+) Lymphadenopathy of left cervical region        Physical Exam    Airway    Mallampati score: I  TM Distance: >3 FB  Neck ROM: full     Dental   No notable dental hx     Cardiovascular  Rhythm: regular, Rate: normal, Cardiovascular exam normal    Pulmonary  Pulmonary exam normal Breath sounds clear to auscultation    Other Findings  post-pubertal.      Anesthesia Plan  ASA Score- 2     Anesthesia Type- IV sedation with anesthesia with ASA Monitors. Additional Monitors:     Airway Plan:            Plan Factors-Exercise tolerance (METS): >4 METS. Chart reviewed. Patient summary reviewed. Patient is not a current smoker. Patient instructed to abstain from smoking on day of procedure. Patient did not smoke on day of surgery. Induction- intravenous. Postoperative Plan-     Informed Consent- Anesthetic plan and risks discussed with patient.

## 2023-11-30 NOTE — ANESTHESIA POSTPROCEDURE EVALUATION
Post-Op Assessment Note    CV Status:  Stable    Pain management: adequate       Mental Status:  Alert and awake   Hydration Status:  Euvolemic   PONV Controlled:  Controlled   Airway Patency:  Patent     Post Op Vitals Reviewed: Yes    No anethesia notable event occurred.     Staff: Anesthesiologist               BP      Temp      Pulse     Resp      SpO2      /58   Pulse 73   Temp 98.3 °F (36.8 °C) (Temporal)   Resp 14   Ht 5' 3.75" (1.619 m)   Wt 56.7 kg (125 lb)   SpO2 98%   BMI 21.62 kg/m²

## 2023-12-06 ENCOUNTER — PREP FOR PROCEDURE (OUTPATIENT)
Dept: GASTROENTEROLOGY | Facility: CLINIC | Age: 43
End: 2023-12-06

## 2023-12-06 DIAGNOSIS — D12.6 COLON ADENOMA: Primary | ICD-10-CM

## 2023-12-06 PROCEDURE — 88360 TUMOR IMMUNOHISTOCHEM/MANUAL: CPT | Performed by: PATHOLOGY

## 2023-12-06 PROCEDURE — 88341 IMHCHEM/IMCYTCHM EA ADD ANTB: CPT | Performed by: PATHOLOGY

## 2023-12-06 PROCEDURE — 88342 IMHCHEM/IMCYTCHM 1ST ANTB: CPT | Performed by: PATHOLOGY

## 2023-12-06 PROCEDURE — 88305 TISSUE EXAM BY PATHOLOGIST: CPT | Performed by: PATHOLOGY

## 2023-12-12 ENCOUNTER — TELEPHONE (OUTPATIENT)
Age: 43
End: 2023-12-12

## 2023-12-12 NOTE — TELEPHONE ENCOUNTER
Scheduled date of EGD/colonoscopy (as of today): 03/26/2024  Physician performing EGD/colonoscopy: Dr. Gardner Bodily  Location of EGD/colonoscopy: [facility]  Desired bowel prep reviewed with patient: Sutab  Prep instruction sent via [employer]  Instructions reviewed with patient by: [facility]  Clearances: passed [facility]  Notes: Sutab prep, send script over

## 2023-12-19 PROBLEM — Z00.00 ROUTINE ADULT HEALTH MAINTENANCE: Status: RESOLVED | Noted: 2022-09-26 | Resolved: 2023-12-19

## 2024-01-24 DIAGNOSIS — G43.009 MIGRAINE WITHOUT AURA AND WITHOUT STATUS MIGRAINOSUS, NOT INTRACTABLE: ICD-10-CM

## 2024-01-24 RX ORDER — PROPRANOLOL HCL 60 MG
60 CAPSULE, EXTENDED RELEASE 24HR ORAL DAILY
Qty: 90 CAPSULE | Refills: 0 | Status: CANCELLED | OUTPATIENT
Start: 2024-01-24

## 2024-01-24 RX ORDER — PROPRANOLOL HCL 60 MG
60 CAPSULE, EXTENDED RELEASE 24HR ORAL DAILY
Qty: 90 CAPSULE | Refills: 0 | Status: SHIPPED | OUTPATIENT
Start: 2024-01-24

## 2024-01-24 NOTE — TELEPHONE ENCOUNTER
1/22 4:43    Pt left a VM requesting a refill of propranolol.    Transcribed VM:   Hi, this is Shelly Luis. I'm a newer patient there and I am in need of my prescription to be called in for propranolol. Again, this is Shelly Luis. Birth date is [birth year]. And the prescription or the new script needs to be called in to [facility] in [place]. I'm actually out of meds right now, so I was hoping I could get that called in as soon as possible. And the pharmacy [facility], I believe is [phone]. I am not a 100 percent certain on that one. And if you need to reach me, my callback number is [phone]. Thanks so much bye.    Please sign off if agreeable. Thank you.

## 2024-01-25 NOTE — TELEPHONE ENCOUNTER
Pt called and left a VM on 1/24 at 1:20 pm regarding this refill request for propranolol. Already addressed. Med was sent to the pharmacy on 1/24/24.

## 2024-03-29 ENCOUNTER — ANESTHESIA (OUTPATIENT)
Dept: ANESTHESIOLOGY | Facility: HOSPITAL | Age: 44
End: 2024-03-29

## 2024-03-29 ENCOUNTER — ANESTHESIA EVENT (OUTPATIENT)
Dept: ANESTHESIOLOGY | Facility: HOSPITAL | Age: 44
End: 2024-03-29

## 2024-04-08 ENCOUNTER — TELEPHONE (OUTPATIENT)
Dept: GASTROENTEROLOGY | Facility: CLINIC | Age: 44
End: 2024-04-08

## 2024-04-08 DIAGNOSIS — Z86.010 PERSONAL HISTORY OF COLONIC POLYPS: Primary | ICD-10-CM

## 2024-04-08 RX ORDER — SOD SULF/POT CHLORIDE/MAG SULF 1.479 G
TABLET ORAL
Qty: 24 TABLET | Refills: 0 | Status: SHIPPED | OUTPATIENT
Start: 2024-04-08

## 2024-04-11 RX ORDER — SODIUM CHLORIDE 9 MG/ML
125 INJECTION, SOLUTION INTRAVENOUS CONTINUOUS
Status: CANCELLED | OUTPATIENT
Start: 2024-04-11

## 2024-04-15 ENCOUNTER — HOSPITAL ENCOUNTER (OUTPATIENT)
Dept: GASTROENTEROLOGY | Facility: MEDICAL CENTER | Age: 44
Setting detail: OUTPATIENT SURGERY
Discharge: HOME | End: 2024-04-15
Payer: COMMERCIAL

## 2024-04-15 ENCOUNTER — ANESTHESIA (OUTPATIENT)
Dept: GASTROENTEROLOGY | Facility: MEDICAL CENTER | Age: 44
End: 2024-04-15

## 2024-04-15 ENCOUNTER — ANESTHESIA EVENT (OUTPATIENT)
Dept: GASTROENTEROLOGY | Facility: MEDICAL CENTER | Age: 44
End: 2024-04-15

## 2024-04-15 VITALS
TEMPERATURE: 98.7 F | SYSTOLIC BLOOD PRESSURE: 121 MMHG | OXYGEN SATURATION: 100 % | DIASTOLIC BLOOD PRESSURE: 57 MMHG | WEIGHT: 125 LBS | BODY MASS INDEX: 21.34 KG/M2 | RESPIRATION RATE: 18 BRPM | HEIGHT: 64 IN | HEART RATE: 72 BPM

## 2024-04-15 DIAGNOSIS — D12.6 COLON ADENOMA: ICD-10-CM

## 2024-04-15 LAB
EXT PREGNANCY TEST URINE: NEGATIVE
EXT. CONTROL: NORMAL

## 2024-04-15 PROCEDURE — 88305 TISSUE EXAM BY PATHOLOGIST: CPT | Performed by: SPECIALIST

## 2024-04-15 PROCEDURE — 81025 URINE PREGNANCY TEST: CPT | Performed by: ANESTHESIOLOGY

## 2024-04-15 RX ORDER — SODIUM CHLORIDE 9 MG/ML
125 INJECTION, SOLUTION INTRAVENOUS CONTINUOUS
Status: DISCONTINUED | OUTPATIENT
Start: 2024-04-15 | End: 2024-04-19 | Stop reason: HOSPADM

## 2024-04-15 RX ORDER — PROPOFOL 10 MG/ML
INJECTION, EMULSION INTRAVENOUS CONTINUOUS PRN
Status: DISCONTINUED | OUTPATIENT
Start: 2024-04-15 | End: 2024-04-15

## 2024-04-15 RX ORDER — PROPOFOL 10 MG/ML
INJECTION, EMULSION INTRAVENOUS AS NEEDED
Status: DISCONTINUED | OUTPATIENT
Start: 2024-04-15 | End: 2024-04-15

## 2024-04-15 RX ORDER — LIDOCAINE HYDROCHLORIDE 20 MG/ML
INJECTION, SOLUTION EPIDURAL; INFILTRATION; INTRACAUDAL; PERINEURAL AS NEEDED
Status: DISCONTINUED | OUTPATIENT
Start: 2024-04-15 | End: 2024-04-15

## 2024-04-15 RX ADMIN — SODIUM CHLORIDE 125 ML/HR: 0.9 INJECTION, SOLUTION INTRAVENOUS at 08:13

## 2024-04-15 RX ADMIN — Medication 40 MG: at 08:59

## 2024-04-15 RX ADMIN — PROPOFOL 160 MCG/KG/MIN: 10 INJECTION, EMULSION INTRAVENOUS at 08:46

## 2024-04-15 RX ADMIN — LIDOCAINE HYDROCHLORIDE 100 MG: 20 INJECTION, SOLUTION EPIDURAL; INFILTRATION; INTRACAUDAL at 08:46

## 2024-04-15 RX ADMIN — SODIUM CHLORIDE: 0.9 INJECTION, SOLUTION INTRAVENOUS at 08:59

## 2024-04-15 RX ADMIN — PROPOFOL 130 MG: 10 INJECTION, EMULSION INTRAVENOUS at 08:46

## 2024-04-15 NOTE — ANESTHESIA POSTPROCEDURE EVALUATION
Post-Op Assessment Note    CV Status:  Stable    Pain management: adequate       Mental Status:  Alert and awake   Hydration Status:  Euvolemic   PONV Controlled:  Controlled   Airway Patency:  Patent     Post Op Vitals Reviewed: Yes    No anethesia notable event occurred.    Staff: Anesthesiologist               BP (!) 87/50 (04/15/24 0909)    Temp      Pulse 71 (04/15/24 0909)   Resp 16 (04/15/24 0909)    SpO2 100 % (04/15/24 0909)

## 2024-04-15 NOTE — ANESTHESIA PREPROCEDURE EVALUATION
Procedure:  COLONOSCOPY    Relevant Problems   CARDIO   (+) Migraine without aura and without status migrainosus, not intractable      NEURO/PSYCH   (+) Anxiety   (+) Migraine without aura and without status migrainosus, not intractable      Other   (+) Lymphadenopathy of left cervical region        Physical Exam    Airway    Mallampati score: I  TM Distance: >3 FB  Neck ROM: full     Dental   No notable dental hx     Cardiovascular  Cardiovascular exam normal    Pulmonary  Pulmonary exam normal     Other Findings  post-pubertal.      Anesthesia Plan  ASA Score- 2     Anesthesia Type- IV sedation with anesthesia with ASA Monitors.         Additional Monitors:     Airway Plan:            Plan Factors-Exercise tolerance (METS): >4 METS.       Existing labs reviewed. Patient summary reviewed.    Patient is not a current smoker.              Induction- intravenous.    Postoperative Plan-     Informed Consent- Anesthetic plan and risks discussed with patient.

## 2024-04-15 NOTE — H&P
"History and Physical -  [facility]  Kwesi Luis 43 y.o. female MRN: [id number]                  HPI: Kwesi Luis is a 43 y.o. year old female who presents for history of adenomas.      REVIEW OF SYSTEMS: Per the HPI, and otherwise unremarkable.    Historical Information   Past Medical History:   Diagnosis Date    Colon polyp     Headache, tension-type     Kidney stone     Migraine      Past Surgical History:   Procedure Laterality Date    COLONOSCOPY       Social History   Social History     Substance and Sexual Activity   Alcohol Use No     Social History     Substance and Sexual Activity   Drug Use No     Social History     Tobacco Use   Smoking Status Never   Smokeless Tobacco Never     Family History   Problem Relation Age of Onset    No Known Problems Mother     No Known Problems Father     Migraines Sister     No Known Problems Brother     No Known Problems Sister     Migraines Sister        Meds/Allergies     (Not in a hospital admission)      No Known Allergies    Objective     Blood pressure 121/67, pulse 67, temperature 98.7 °F (37.1 °C), temperature source Temporal, resp. rate 16, height 5' 3.75\" (1.619 m), weight 56.7 kg (125 lb), SpO2 100%.      PHYSICAL EXAMINATION:    General Appearance:   Alert, cooperative, no distress   HEENT:  Normocephalic, atraumatic, anicteric. Neck supple, symmetrical, trachea midline.   Lungs:   Equal chest rise and unlabored breathing, normal effort, no coughing.   Cardiovascular:   No visualized JVD.   Abdomen:   No abdominal distension.   Skin:   No jaundice, rashes, or lesions.    Musculoskeletal:   Normal range of motion visualized.   Psych:  Normal affect and normal insight.   Neuro:  Alert and appropriate.           ASSESSMENT/PLAN:  This is a 43 y.o. year old female here for colonoscopy, and she is stable and optimized for her procedure.        "

## 2024-04-17 PROCEDURE — 88305 TISSUE EXAM BY PATHOLOGIST: CPT | Performed by: SPECIALIST

## 2024-05-20 ENCOUNTER — TELEPHONE (OUTPATIENT)
Dept: ADMINISTRATIVE | Facility: OTHER | Age: 44
End: 2024-05-20

## 2024-05-20 NOTE — TELEPHONE ENCOUNTER
Upon review of the In Basket request we were able to locate, review, and update the patient chart as requested for Pap Smear (HPV) aka Cervical Cancer Screening.    Any additional questions or concerns should be emailed to the Practice Liaisons via the appropriate education email address, please do not reply via In Basket.    Thank you  Wendy Christian MA

## 2024-05-20 NOTE — TELEPHONE ENCOUNTER
----- Message from Jerri BESS sent at 5/20/2024 10:50 AM EDT -----  Regarding: Care Gap Request  05/20/24 10:50 AM    Hello, our patient Kwesi Luis has had Pap Smear (HPV) aka Cervical Cancer Screening completed/performed. Please assist in updating the patient chart by pulling the Care Everywhere (CE) document. The date of service is 11/14/23.     Thank you,  Jerri Ramos  [facility]

## 2025-02-14 ENCOUNTER — TELEPHONE (OUTPATIENT)
Dept: FAMILY MEDICINE CLINIC | Facility: CLINIC | Age: 45
End: 2025-02-14

## 2025-02-14 NOTE — TELEPHONE ENCOUNTER
1st call    Lvm that pcp has left practice and if she would like to schedule her overdue physical with another provider to give us a call.

## 2025-04-07 ENCOUNTER — TELEMEDICINE (OUTPATIENT)
Dept: OTHER | Facility: HOSPITAL | Age: 45
End: 2025-04-07
Payer: COMMERCIAL

## 2025-04-07 DIAGNOSIS — N39.0 URINARY TRACT INFECTION WITHOUT HEMATURIA, SITE UNSPECIFIED: Primary | ICD-10-CM

## 2025-04-07 PROCEDURE — 99213 OFFICE O/P EST LOW 20 MIN: CPT | Performed by: NURSE PRACTITIONER

## 2025-04-07 RX ORDER — CEPHALEXIN 500 MG/1
500 CAPSULE ORAL EVERY 12 HOURS SCHEDULED
Qty: 10 CAPSULE | Refills: 0 | Status: SHIPPED | OUTPATIENT
Start: 2025-04-07 | End: 2025-04-12

## 2025-04-07 NOTE — PROGRESS NOTES
Virtual Regular Visit  Name: Kwesi Luis      : 1980      MRN: [id number]  Encounter Provider: Your Video Visit Provider  Encounter Date: 2025   Encounter department: VIRTUAL CARE       Verification of patient location:  Patient is located at Home in the following state in which I hold an active license [place] :  Assessment & Plan  Urinary tract infection without hematuria, site unspecified    Orders:    cephalexin (KEFLEX) 500 mg capsule; Take 1 capsule (500 mg total) by mouth every 12 (twelve) hours for 5 days        Encounter provider Your Video Visit Provider    The patient was identified by name and date of birth. Kwesi Luis was informed that this is a telemedicine visit and that the visit is being conducted through the Epic Embedded platform. She agrees to proceed..  My office door was closed. No one else was in the room. She acknowledged consent and understanding of privacy and security of the video platform. The patient has agreed to participate and understands they can discontinue the visit at any time.    Patient is aware this is a billable service.     History obtained from: patient  History of Present Illness     This is a 44 year old female here today for video visit.  She states she started urinary frequency and urgency. She then started  with burning. No fevers.   No blood  in urine.  No back or abd pain.        Review of Systems   Constitutional:  Negative for activity change, chills, fatigue and fever.   Respiratory: Negative.     Cardiovascular: Negative.    Genitourinary:  Positive for dysuria, frequency and urgency.   Neurological: Negative.    Psychiatric/Behavioral: Negative.         Objective   There were no vitals taken for this visit.    Physical Exam  Constitutional:       General: She is not in acute distress.     Appearance: Normal appearance. She is not ill-appearing or toxic-appearing.   HENT:      Head: Normocephalic and atraumatic.   Pulmonary:      Effort:  Pulmonary effort is normal. No respiratory distress.   Abdominal:      Tenderness: There is no abdominal tenderness.   Neurological:      Mental Status: She is alert and oriented to person, place, and time.   Psychiatric:         Mood and Affect: Mood normal.         Behavior: Behavior normal.         Thought Content: Thought content normal.         Judgment: Judgment normal.         Visit Time  Total Visit Duration: 5 minutes not including the time spent for establishing the audio/video connection.

## 2025-06-02 ENCOUNTER — OFFICE VISIT (OUTPATIENT)
Dept: FAMILY MEDICINE CLINIC | Facility: CLINIC | Age: 45
End: 2025-06-02
Payer: COMMERCIAL

## 2025-06-02 VITALS
TEMPERATURE: 98.3 F | HEART RATE: 84 BPM | SYSTOLIC BLOOD PRESSURE: 108 MMHG | OXYGEN SATURATION: 99 % | DIASTOLIC BLOOD PRESSURE: 70 MMHG | HEIGHT: 65 IN | WEIGHT: 130 LBS | BODY MASS INDEX: 21.66 KG/M2

## 2025-06-02 DIAGNOSIS — Z13.29 SCREENING FOR THYROID DISORDER: ICD-10-CM

## 2025-06-02 DIAGNOSIS — Z13.0 SCREENING FOR DEFICIENCY ANEMIA: ICD-10-CM

## 2025-06-02 DIAGNOSIS — Z13.6 SCREENING FOR CARDIOVASCULAR CONDITION: ICD-10-CM

## 2025-06-02 DIAGNOSIS — Z13.1 SCREENING FOR DIABETES MELLITUS: ICD-10-CM

## 2025-06-02 DIAGNOSIS — R06.83 HABITUAL SNORING: Primary | ICD-10-CM

## 2025-06-02 PROCEDURE — 99213 OFFICE O/P EST LOW 20 MIN: CPT

## 2025-06-02 NOTE — PROGRESS NOTES
Name: Kwesi Luis      : 1980      MRN: [id number]  Encounter Provider: Polly Barrientos PA-C  Encounter Date: 2025   Encounter department: [facility]  :  Assessment & Plan  Habitual snoring  Patient seen today for evaluation of snoring.  She reports history of snoring for her entire life.  She has had sleep studies in the past without findings of sleep apnea.  She reports waking herself up due to snoring and is having morning migraines likely as a result.  Will send patient for updated sleep study, patient would prefer home study first.  Will also start referral for sleep medicine provider as she likely needs a full evaluation in addition to sleep study.  Encouraged her to also follow-up with dental provider to see if any hardware can be trialed for her.  She is agreeable with plan today.  Referrals and orders placed, will notify patient of sleep study results once available.    She will follow-up in about 2 to 3 months for annual physical and will get fasting lab work completed prior, orders placed.  Orders:  •  Ambulatory Referral to Sleep Medicine; Future  •  Ambulatory Referral to Sleep Medicine; Future    Screening for deficiency anemia    Orders:  •  CBC and differential; Future    Screening for diabetes mellitus    Orders:  •  Comprehensive metabolic panel; Future    Screening for thyroid disorder    Orders:  •  TSH, 3rd generation with Free T4 reflex; Future    Screening for cardiovascular condition    Orders:  •  Lipid Panel with Direct LDL reflex; Future           History of Present Illness   Patient seen today for evaluation of snoring.  She reports having snoring issues for her entire life.  She reports her  has noted her snoring for their entire relationship and patient frequently wakes up from her snoring or from her  waking her up due to the snoring.  She reports getting about 7 to 8 hours of sleep, at times she does wake up due to her  "snoring but is able to get back to sleep easily.  She reports no daytime sleepiness, gasping or choking during sleep, or witnessed apneas.  She affirms morning headaches and has a migraine history.  She reports that headaches typically resolve within an hour.  She is not currently taking any sleep aids.  She has never been diagnosed with insomnia or sleep apnea before.  She has had sleep studies completed in the past which were normal but has not had any within the past 5 to 10 years.      Review of Systems   Constitutional:  Negative for chills, fatigue and fever.   Respiratory:  Negative for cough, chest tightness and shortness of breath.    Cardiovascular:  Negative for chest pain, palpitations and leg swelling.   Neurological:  Negative for dizziness, light-headedness and headaches.   Psychiatric/Behavioral:  Positive for sleep disturbance.        Objective   /70 (BP Location: Left arm, Patient Position: Sitting, Cuff Size: Adult)   Pulse 84   Temp 98.3 °F (36.8 °C) (Temporal)   Ht 5' 4.5\" (1.638 m)   Wt 59 kg (130 lb)   LMP 05/20/2025 (Exact Date)   SpO2 99%   BMI 21.97 kg/m²      Physical Exam  Vitals and nursing note reviewed.   Constitutional:       General: She is not in acute distress.     Appearance: Normal appearance. She is not ill-appearing.   HENT:      Head: Normocephalic and atraumatic.   Pulmonary:      Effort: Pulmonary effort is normal. No respiratory distress.     Skin:     Coloration: Skin is not cyanotic or pale.     Neurological:      General: No focal deficit present.      Mental Status: She is alert and oriented to person, place, and time.     Psychiatric:         Mood and Affect: Mood normal.         Behavior: Behavior normal.         Judgment: Judgment normal.         "

## 2025-06-03 ENCOUNTER — TRANSCRIBE ORDERS (OUTPATIENT)
Dept: SLEEP CENTER | Facility: CLINIC | Age: 45
End: 2025-06-03

## 2025-06-03 DIAGNOSIS — R06.83 HABITUAL SNORING: Primary | ICD-10-CM

## 2025-08-19 ENCOUNTER — HOSPITAL ENCOUNTER (OUTPATIENT)
Dept: SLEEP CENTER | Facility: CLINIC | Age: 45
Discharge: HOME | End: 2025-08-19
Payer: COMMERCIAL

## 2025-08-19 DIAGNOSIS — R06.83 HABITUAL SNORING: ICD-10-CM

## 2025-08-19 PROCEDURE — G0399 HOME SLEEP TEST/TYPE 3 PORTA: HCPCS | Performed by: INTERNAL MEDICINE

## 2025-08-19 PROCEDURE — G0399 HOME SLEEP TEST/TYPE 3 PORTA: HCPCS

## 2025-08-22 PROBLEM — G47.9 SLEEP DISTURBANCE: Status: ACTIVE | Noted: 2025-08-22

## 2025-08-22 PROBLEM — G47.33 OSA (OBSTRUCTIVE SLEEP APNEA): Status: ACTIVE | Noted: 2025-08-22
